# Patient Record
Sex: MALE | Race: WHITE | Employment: PART TIME | URBAN - METROPOLITAN AREA
[De-identification: names, ages, dates, MRNs, and addresses within clinical notes are randomized per-mention and may not be internally consistent; named-entity substitution may affect disease eponyms.]

---

## 2018-03-26 ENCOUNTER — HOSPITAL ENCOUNTER (EMERGENCY)
Age: 53
Discharge: HOME | End: 2018-03-26
Payer: MEDICARE

## 2018-03-26 VITALS
TEMPERATURE: 97.4 F | HEART RATE: 93 BPM | SYSTOLIC BLOOD PRESSURE: 136 MMHG | RESPIRATION RATE: 18 BRPM | WEIGHT: 190 LBS | HEIGHT: 70 IN | BODY MASS INDEX: 27.2 KG/M2 | OXYGEN SATURATION: 97 % | DIASTOLIC BLOOD PRESSURE: 72 MMHG

## 2018-03-26 DIAGNOSIS — R05.9 COUGH: ICD-10-CM

## 2018-03-26 DIAGNOSIS — J06.9 UPPER RESPIRATORY TRACT INFECTION, UNSPECIFIED TYPE: Primary | ICD-10-CM

## 2018-03-26 PROCEDURE — 99282 EMERGENCY DEPT VISIT SF MDM: CPT

## 2018-03-26 RX ORDER — ALBUTEROL SULFATE 90 UG/1
2 AEROSOL, METERED RESPIRATORY (INHALATION) EVERY 6 HOURS PRN
Qty: 1 INHALER | Refills: 0 | Status: SHIPPED | OUTPATIENT
Start: 2018-03-26 | End: 2020-01-06

## 2018-03-26 RX ORDER — LORATADINE AND PSEUDOEPHEDRINE 10; 240 MG/1; MG/1
1 TABLET, EXTENDED RELEASE ORAL DAILY
Qty: 12 TABLET | Refills: 0 | Status: SHIPPED | OUTPATIENT
Start: 2018-03-26 | End: 2020-01-06

## 2018-03-26 RX ORDER — DOXYCYCLINE HYCLATE 100 MG
100 TABLET ORAL 2 TIMES DAILY
Qty: 14 TABLET | Refills: 0 | Status: SHIPPED | OUTPATIENT
Start: 2018-03-26 | End: 2018-04-02

## 2018-03-26 NOTE — ED PROVIDER NOTES
[facility]     Department of Emergency Medicine   ED  Provider Note  Admit Date/RoomTime: 3/26/2018 10:23 AM  ED Room: /    Chief Complaint:   Cough    History of Present Illness      Queta Shaw is a 48 y.o. old male who presents to the ED for cough And congestion that has been ongoing for the past couple days. Patient states he does have a past history of emphysema. He does not currently take any inhalers. He is a diabetic. He denies any chest pain or shortness of breath. He has no abdominal pain, nausea, vomiting, diarrhea, limb pain or swelling, fever/chills, neck pain, back pain, or recent trauma/injury. ×3 and in no apparent distress at this exam. Patient is nontoxic appearing. Patient is a current, everyday smoker. ROS   Pertinent positives and negatives are stated within HPI, all other systems reviewed and are negative. Past Medical History:  has a past medical history of Diabetes mellitus ([address] and Emphysema. Past Surgical History:  has a past surgical history that includes Vasectomy. Social History:  reports that he has been smoking Cigarettes. He has been smoking about 2.00 packs per day. He does not have any smokeless tobacco history on file. He reports that he drinks alcohol. He reports that he does not use drugs. Family History: family history is not on file. The patients home medications have been reviewed. Allergies: Patient has no known allergies. Allergies have been reviewed with patient. Physical Exam   VS:  /72   Pulse 93   Temp 97.4 °F (36.3 °C) (Oral)   Resp 18   Ht 5' 10\" (1.778 m)   Wt 190 lb (86.2 kg)   SpO2 97%   BMI 27.26 kg/m²      Oxygen Saturation Interpretation: Normal.    Constitutional:  Alert, development consistent with age. NAD  Eyes: EOMI, non-injected conjunctiva   Ears:  Fluid behind both TMs  Throat: Moderate erythema, uvula midline, Airway patent     Neck/Lymphatic:  Supple. There is no cervical node tenderness.  No meningeal signs   Respiratory:  Clear to auscultation and breath sounds equal. No respiratory distress  CV: Regular rate and rhythm  GI:  Abdomen soft, nontender, No firm or pulsatile mass. Integument:  No rashes or erythema present. Neurological:  Motor functions intact. Lab / Imaging Results   (All laboratory and radiology results have been personally reviewed by myself)  Labs:  No results found for this visit on 03/26/18. Imaging: All Radiology results interpreted by Radiologist unless otherwise noted. No orders to display     ED Course / Medical Decision Making   ED Medications:   Medications - No data to display    Consults:  None    Procedures:  None    Medical Decision Making:   Patient is well appearing, non toxic and appropriate for outpatient management. Plan is for symptom management and PCP follow up. Counseling: The emergency provider has spoken with the patient and discussed todays results, in addition to providing specific details for the plan of care and counseling regarding the diagnosis and prognosis. Questions are answered at this time and they are agreeable with the plan. All results reviewed with pt and all questions answered. Patient understands that they must follow-up with PCP. Patient was advised to return to ED if symptoms worsen or new symptoms develop. Pt remained nontoxic, non-hypoxic, and A&O x4 during this ED visit. They agreed with plan of care, discharge, and importance of follow-up. Pt was in no distress at discharge. Vitals stable. Patient educated on newly prescribed medication. Assessment     1. Upper respiratory tract infection, unspecified type    2.  Cough      Plan   Discharge to home   Patient condition is good    New Medications     Discharge Medication List as of 3/26/2018 10:47 AM      START taking these medications    Details   doxycycline hyclate (VIBRA-TABS) 100 MG tablet Take 1 tablet by mouth 2 times daily for 7 days, Disp-14 tablet,

## 2018-06-12 ENCOUNTER — HOSPITAL ENCOUNTER (OUTPATIENT)
Age: 53
Discharge: HOME | End: 2018-06-14
Payer: MEDICARE

## 2018-06-12 PROCEDURE — G0103 PSA SCREENING: HCPCS

## 2018-06-13 LAB — PROSTATE SPECIFIC ANTIGEN: 0.14 NG/ML (ref 0–4)

## 2019-02-08 ENCOUNTER — HOSPITAL ENCOUNTER (OUTPATIENT)
Dept: ULTRASOUND IMAGING | Age: 54
Discharge: HOME | End: 2019-02-10
Payer: MEDICARE

## 2019-02-08 DIAGNOSIS — I10 ESSENTIAL HYPERTENSION, BENIGN: ICD-10-CM

## 2019-02-08 DIAGNOSIS — E11.00 TYPE 2 DIABETES MELLITUS WITH HYPEROSMOLARITY WITHOUT COMA, WITHOUT LONG-TERM CURRENT USE OF INSULIN (HCC): ICD-10-CM

## 2019-02-08 DIAGNOSIS — I10 ESSENTIAL HYPERTENSION, MALIGNANT: ICD-10-CM

## 2019-02-08 DIAGNOSIS — I73.9 PERIPHERAL VASCULAR DISEASE, UNSPECIFIED (HCC): ICD-10-CM

## 2019-02-08 PROCEDURE — 93880 EXTRACRANIAL BILAT STUDY: CPT

## 2019-02-08 PROCEDURE — 93930 UPPER EXTREMITY STUDY: CPT

## 2019-02-11 ENCOUNTER — HOSPITAL ENCOUNTER (OUTPATIENT)
Age: 54
Discharge: HOME | End: 2019-02-11
Payer: MEDICARE

## 2019-02-11 ENCOUNTER — OFFICE VISIT (OUTPATIENT)
Dept: VASCULAR SURGERY | Age: 54
End: 2019-02-11
Payer: MEDICARE

## 2019-02-11 VITALS — SYSTOLIC BLOOD PRESSURE: 108 MMHG | DIASTOLIC BLOOD PRESSURE: 66 MMHG | HEART RATE: 68 BPM

## 2019-02-11 DIAGNOSIS — I65.23 ASYMPTOMATIC BILATERAL CAROTID ARTERY STENOSIS: ICD-10-CM

## 2019-02-11 DIAGNOSIS — Z72.0 TOBACCO ABUSE: ICD-10-CM

## 2019-02-11 DIAGNOSIS — I70.8 OCCLUSION OF LEFT SUBCLAVIAN ARTERY: ICD-10-CM

## 2019-02-11 DIAGNOSIS — Z71.6 TOBACCO ABUSE COUNSELING: ICD-10-CM

## 2019-02-11 DIAGNOSIS — I73.9 PVD (PERIPHERAL VASCULAR DISEASE) (HCC): ICD-10-CM

## 2019-02-11 DIAGNOSIS — I65.23 ASYMPTOMATIC BILATERAL CAROTID ARTERY STENOSIS: Primary | ICD-10-CM

## 2019-02-11 LAB
ANION GAP SERPL CALCULATED.3IONS-SCNC: 9 MMOL/L (ref 7–16)
BUN BLDV-MCNC: 12 MG/DL (ref 6–20)
CALCIUM SERPL-MCNC: 9.4 MG/DL (ref 8.6–10.2)
CHLORIDE BLD-SCNC: 103 MMOL/L (ref 98–107)
CO2: 28 MMOL/L (ref 22–29)
CREAT SERPL-MCNC: 0.7 MG/DL (ref 0.7–1.2)
GFR AFRICAN AMERICAN: >60
GFR NON-AFRICAN AMERICAN: >60 ML/MIN/1.73
GLUCOSE BLD-MCNC: 204 MG/DL (ref 74–99)
POTASSIUM SERPL-SCNC: 4.6 MMOL/L (ref 3.5–5)
SODIUM BLD-SCNC: 140 MMOL/L (ref 132–146)

## 2019-02-11 PROCEDURE — 99205 OFFICE O/P NEW HI 60 MIN: CPT | Performed by: SURGERY

## 2019-02-11 PROCEDURE — 36415 COLL VENOUS BLD VENIPUNCTURE: CPT

## 2019-02-11 PROCEDURE — 80048 BASIC METABOLIC PNL TOTAL CA: CPT

## 2019-02-11 RX ORDER — INSULIN GLARGINE 300 U/ML
INJECTION, SOLUTION SUBCUTANEOUS
Refills: 2 | COMMUNITY
Start: 2019-02-06

## 2019-02-11 RX ORDER — NICOTINE 21 MG/24HR
1 PATCH, TRANSDERMAL 24 HOURS TRANSDERMAL EVERY 24 HOURS
Qty: 30 PATCH | Refills: 3 | Status: SHIPPED | OUTPATIENT
Start: 2019-02-11 | End: 2020-01-06

## 2019-02-15 ENCOUNTER — TELEPHONE (OUTPATIENT)
Dept: VASCULAR SURGERY | Age: 54
End: 2019-02-15

## 2019-02-19 ENCOUNTER — HOSPITAL ENCOUNTER (OUTPATIENT)
Dept: INTERVENTIONAL RADIOLOGY/VASCULAR | Age: 54
Discharge: HOME | End: 2019-02-21
Payer: MEDICARE

## 2019-02-19 ENCOUNTER — HOSPITAL ENCOUNTER (OUTPATIENT)
Dept: CT IMAGING | Age: 54
Discharge: HOME | End: 2019-02-21
Payer: MEDICARE

## 2019-02-19 DIAGNOSIS — I73.9 PVD (PERIPHERAL VASCULAR DISEASE) (HCC): ICD-10-CM

## 2019-02-19 DIAGNOSIS — I65.23 ASYMPTOMATIC BILATERAL CAROTID ARTERY STENOSIS: ICD-10-CM

## 2019-02-19 PROCEDURE — 6360000004 HC RX CONTRAST MEDICATION: Performed by: RADIOLOGY

## 2019-02-19 PROCEDURE — 93923 UPR/LXTR ART STDY 3+ LVLS: CPT

## 2019-02-19 PROCEDURE — 2580000003 HC RX 258: Performed by: RADIOLOGY

## 2019-02-19 RX ORDER — SODIUM CHLORIDE 0.9 % (FLUSH) 0.9 %
10 SYRINGE (ML) INJECTION 2 TIMES DAILY
Status: DISCONTINUED | OUTPATIENT
Start: 2019-02-19 | End: 2019-02-22 | Stop reason: HOSPADM

## 2019-02-19 RX ADMIN — Medication 10 ML: at 06:35

## 2019-02-19 RX ADMIN — IOPAMIDOL 160 ML: 755 INJECTION, SOLUTION INTRAVENOUS at 06:53

## 2019-02-21 ENCOUNTER — HOSPITAL ENCOUNTER (OUTPATIENT)
Dept: CT IMAGING | Age: 54
Discharge: HOME | End: 2019-02-23
Payer: MEDICARE

## 2019-02-21 PROCEDURE — 6360000004 HC RX CONTRAST MEDICATION: Performed by: RADIOLOGY

## 2019-02-21 PROCEDURE — 70498 CT ANGIOGRAPHY NECK: CPT

## 2019-02-21 PROCEDURE — 2580000003 HC RX 258: Performed by: RADIOLOGY

## 2019-02-21 RX ORDER — SODIUM CHLORIDE 0.9 % (FLUSH) 0.9 %
10 SYRINGE (ML) INJECTION 2 TIMES DAILY
Status: DISCONTINUED | OUTPATIENT
Start: 2019-02-21 | End: 2019-02-24 | Stop reason: HOSPADM

## 2019-02-21 RX ADMIN — Medication 10 ML: at 06:57

## 2019-02-21 RX ADMIN — IOPAMIDOL 90 ML: 755 INJECTION, SOLUTION INTRAVENOUS at 07:08

## 2019-02-25 ENCOUNTER — OFFICE VISIT (OUTPATIENT)
Dept: VASCULAR SURGERY | Age: 54
End: 2019-02-25
Payer: MEDICARE

## 2019-02-25 DIAGNOSIS — I73.9 PVD (PERIPHERAL VASCULAR DISEASE) (HCC): ICD-10-CM

## 2019-02-25 DIAGNOSIS — Z71.6 TOBACCO ABUSE COUNSELING: ICD-10-CM

## 2019-02-25 DIAGNOSIS — I70.8 OCCLUSION OF LEFT SUBCLAVIAN ARTERY: ICD-10-CM

## 2019-02-25 DIAGNOSIS — I65.23 ASYMPTOMATIC BILATERAL CAROTID ARTERY STENOSIS: Primary | ICD-10-CM

## 2019-02-25 DIAGNOSIS — Z72.0 TOBACCO ABUSE: ICD-10-CM

## 2019-02-25 PROCEDURE — 99214 OFFICE O/P EST MOD 30 MIN: CPT | Performed by: SURGERY

## 2019-09-09 ENCOUNTER — HOSPITAL ENCOUNTER (OUTPATIENT)
Dept: CARDIOLOGY | Age: 54
Discharge: HOME | End: 2019-09-09
Payer: MEDICARE

## 2019-09-09 ENCOUNTER — OFFICE VISIT (OUTPATIENT)
Dept: VASCULAR SURGERY | Age: 54
End: 2019-09-09
Payer: MEDICARE

## 2019-09-09 DIAGNOSIS — I65.23 ASYMPTOMATIC BILATERAL CAROTID ARTERY STENOSIS: ICD-10-CM

## 2019-09-09 DIAGNOSIS — I73.9 PVD (PERIPHERAL VASCULAR DISEASE) (HCC): ICD-10-CM

## 2019-09-09 DIAGNOSIS — I65.23 ASYMPTOMATIC BILATERAL CAROTID ARTERY STENOSIS: Primary | ICD-10-CM

## 2019-09-09 PROCEDURE — 93880 EXTRACRANIAL BILAT STUDY: CPT

## 2019-09-09 PROCEDURE — 99213 OFFICE O/P EST LOW 20 MIN: CPT | Performed by: NURSE PRACTITIONER

## 2019-09-09 NOTE — PROGRESS NOTES
partner: Not on file     Emotionally abused: Not on file     Physically abused: Not on file     Forced sexual activity: Not on file   Other Topics Concern    Not on file   Social History Narrative    Not on file     Family hx  Heart disease, mi  Denies family hx of stroke. Labs  Lab Results   Component Value Date    WBC 6.4 03/30/2016    HGB 17.1 (H) 03/30/2016    HCT 50.4 03/30/2016     03/30/2016    PROTIME 11.7 06/08/2015    INR 1.1 06/08/2015    APTT 26.7 06/08/2015    K 4.6 02/11/2019    BUN 12 02/11/2019    CREATININE 0.7 02/11/2019     PHYSICAL EXAM:    CONSTITUTIONAL:   Awake, alert, cooperative  PSYCHIATRIC :  Oriented to time, place and person     Appropriate insight to disease process  EYES: Lids and lashes normal  ENT:  External ears and nose without lesions   Hearing deficits not noted  CN II _ XII intact, + glasses  NECK: Supple, symmetrical, trachea midline   Carotid bruit on left  LUNGS:  No increased work of breathing                 Clear to auscultation  CARDIOVASCULAR:  regular rate and rhythm   ABDOMEN:  soft, non-distended, non-tender   Aorta is not palpable  SKIN:   Normal skin color   Texture and turgor normal, no induration  EXTREMITIES:   R UE 4/5 strength  L UE 5/5 strength  R LE Edema absent  L LE Edema absent    RADIOLOGY:  R ICA <50% stenosis   R Vertebral has antegrade flow  L ICA 50-59% stenosis   L Vertebral has retrograde flow     A/P Asymptomatic Bilateral Carotid Stenosis  · Ultrasound from today shows  ? R ICA < 50% stenosis, unchanged from previous   ?  L ICA 50-59% stenosis, improved from previous imaging   · No indication for intervention at this time as he is assx and less than 80% stenosis  · Continue medical management with asa  · Emphasized importance of Tobacco cessation - he has cut down from 2.5 packs to 1 pack a day  · Discussed with patient pathophysiology of carotid stenosis and all ?s answered  · Discussed with patient symptoms of stroke, TIA and they

## 2019-12-02 ENCOUNTER — HOSPITAL ENCOUNTER (OUTPATIENT)
Age: 54
Discharge: HOME | End: 2019-12-04
Payer: MEDICARE

## 2019-12-02 LAB
ALBUMIN SERPL-MCNC: 4.2 G/DL (ref 3.5–5.2)
ALP BLD-CCNC: 84 U/L (ref 40–129)
ALT SERPL-CCNC: 26 U/L (ref 0–40)
ANION GAP SERPL CALCULATED.3IONS-SCNC: 12 MMOL/L (ref 7–16)
AST SERPL-CCNC: 24 U/L (ref 0–39)
BILIRUB SERPL-MCNC: 0.3 MG/DL (ref 0–1.2)
BUN BLDV-MCNC: 14 MG/DL (ref 6–20)
CALCIUM SERPL-MCNC: 9.6 MG/DL (ref 8.6–10.2)
CHLORIDE BLD-SCNC: 101 MMOL/L (ref 98–107)
CO2: 25 MMOL/L (ref 22–29)
CREAT SERPL-MCNC: 0.7 MG/DL (ref 0.7–1.2)
GFR AFRICAN AMERICAN: >60
GFR NON-AFRICAN AMERICAN: >60 ML/MIN/1.73
GLUCOSE BLD-MCNC: 133 MG/DL (ref 74–99)
HCT VFR BLD CALC: 47 % (ref 37–54)
HEMOGLOBIN: 15.7 G/DL (ref 12.5–16.5)
MCH RBC QN AUTO: 33.1 PG (ref 26–35)
MCHC RBC AUTO-ENTMCNC: 33.4 % (ref 32–34.5)
MCV RBC AUTO: 99.2 FL (ref 80–99.9)
PDW BLD-RTO: 12.5 FL (ref 11.5–15)
PLATELET # BLD: 163 E9/L (ref 130–450)
PMV BLD AUTO: 10 FL (ref 7–12)
POTASSIUM SERPL-SCNC: 4 MMOL/L (ref 3.5–5)
RBC # BLD: 4.74 E12/L (ref 3.8–5.8)
SODIUM BLD-SCNC: 138 MMOL/L (ref 132–146)
TESTOSTERONE TOTAL: 525.3 NG/DL
TOTAL PROTEIN: 7.2 G/DL (ref 6.4–8.3)
WBC # BLD: 6.4 E9/L (ref 4.5–11.5)

## 2019-12-02 PROCEDURE — 84403 ASSAY OF TOTAL TESTOSTERONE: CPT

## 2019-12-02 PROCEDURE — 85027 COMPLETE CBC AUTOMATED: CPT

## 2019-12-02 PROCEDURE — G0103 PSA SCREENING: HCPCS

## 2019-12-02 PROCEDURE — 84153 ASSAY OF PSA TOTAL: CPT

## 2019-12-02 PROCEDURE — 80053 COMPREHEN METABOLIC PANEL: CPT

## 2019-12-10 LAB
PROSTATE SPECIFIC ANTIGEN: 0.14 NG/ML (ref 0–4)
PROSTATE SPECIFIC ANTIGEN: ABNORMAL NG/ML (ref 0–4)

## 2020-01-06 ENCOUNTER — HOSPITAL ENCOUNTER (OUTPATIENT)
Age: 55
Setting detail: OBSERVATION
Discharge: HOME | End: 2020-01-08
Attending: INTERNAL MEDICINE | Admitting: INTERNAL MEDICINE
Payer: MEDICARE

## 2020-01-06 ENCOUNTER — HOSPITAL ENCOUNTER (EMERGENCY)
Age: 55
Discharge: ANOTHER HOSPITAL | End: 2020-01-06
Attending: EMERGENCY MEDICINE
Payer: MEDICARE

## 2020-01-06 ENCOUNTER — APPOINTMENT (OUTPATIENT)
Dept: CT IMAGING | Age: 55
End: 2020-01-06
Payer: MEDICARE

## 2020-01-06 VITALS
SYSTOLIC BLOOD PRESSURE: 113 MMHG | BODY MASS INDEX: 29.35 KG/M2 | HEIGHT: 70 IN | HEART RATE: 73 BPM | DIASTOLIC BLOOD PRESSURE: 78 MMHG | RESPIRATION RATE: 14 BRPM | OXYGEN SATURATION: 97 % | TEMPERATURE: 97.6 F | WEIGHT: 205 LBS

## 2020-01-06 PROBLEM — Z71.6 TOBACCO ABUSE COUNSELING: Status: RESOLVED | Noted: 2019-02-11 | Resolved: 2020-01-06

## 2020-01-06 PROBLEM — R29.90 STROKE-LIKE SYMPTOMS: Status: ACTIVE | Noted: 2020-01-06

## 2020-01-06 PROBLEM — I70.8 OCCLUSION OF LEFT SUBCLAVIAN ARTERY: Status: RESOLVED | Noted: 2019-02-11 | Resolved: 2020-01-06

## 2020-01-06 LAB
ALBUMIN SERPL-MCNC: 4.5 G/DL (ref 3.5–5.2)
ALP BLD-CCNC: 100 U/L (ref 40–129)
ALT SERPL-CCNC: 45 U/L (ref 0–40)
ANION GAP SERPL CALCULATED.3IONS-SCNC: 14 MMOL/L (ref 7–16)
APTT: 30.8 SEC (ref 24.5–35.1)
AST SERPL-CCNC: 32 U/L (ref 0–39)
BASOPHILS ABSOLUTE: 0.05 E9/L (ref 0–0.2)
BASOPHILS RELATIVE PERCENT: 0.8 % (ref 0–2)
BILIRUB SERPL-MCNC: 0.5 MG/DL (ref 0–1.2)
BUN BLDV-MCNC: 12 MG/DL (ref 6–20)
CALCIUM SERPL-MCNC: 9.6 MG/DL (ref 8.6–10.2)
CHLORIDE BLD-SCNC: 100 MMOL/L (ref 98–107)
CO2: 24 MMOL/L (ref 22–29)
CREAT SERPL-MCNC: 0.6 MG/DL (ref 0.7–1.2)
EKG ATRIAL RATE: 76 BPM
EKG P AXIS: 60 DEGREES
EKG P-R INTERVAL: 200 MS
EKG Q-T INTERVAL: 384 MS
EKG QRS DURATION: 106 MS
EKG QTC CALCULATION (BAZETT): 432 MS
EKG R AXIS: 123 DEGREES
EKG T AXIS: 51 DEGREES
EKG VENTRICULAR RATE: 76 BPM
EOSINOPHILS ABSOLUTE: 0.35 E9/L (ref 0.05–0.5)
EOSINOPHILS RELATIVE PERCENT: 5.8 % (ref 0–6)
GFR AFRICAN AMERICAN: >60
GFR NON-AFRICAN AMERICAN: >60 ML/MIN/1.73
GLUCOSE BLD-MCNC: 156 MG/DL (ref 74–99)
HCT VFR BLD CALC: 50.3 % (ref 37–54)
HEMOGLOBIN: 17.6 G/DL (ref 12.5–16.5)
IMMATURE GRANULOCYTES #: 0 E9/L
IMMATURE GRANULOCYTES %: 0 % (ref 0–5)
INR BLD: 1
LYMPHOCYTES ABSOLUTE: 2.71 E9/L (ref 1.5–4)
LYMPHOCYTES RELATIVE PERCENT: 44.8 % (ref 20–42)
MCH RBC QN AUTO: 34.6 PG (ref 26–35)
MCHC RBC AUTO-ENTMCNC: 35 % (ref 32–34.5)
MCV RBC AUTO: 98.8 FL (ref 80–99.9)
METER GLUCOSE: 219 MG/DL (ref 74–99)
MONOCYTES ABSOLUTE: 0.68 E9/L (ref 0.1–0.95)
MONOCYTES RELATIVE PERCENT: 11.2 % (ref 2–12)
NEUTROPHILS ABSOLUTE: 2.26 E9/L (ref 1.8–7.3)
NEUTROPHILS RELATIVE PERCENT: 37.4 % (ref 43–80)
PDW BLD-RTO: 12.6 FL (ref 11.5–15)
PLATELET # BLD: 154 E9/L (ref 130–450)
PMV BLD AUTO: 9.5 FL (ref 7–12)
POTASSIUM REFLEX MAGNESIUM: 4.6 MMOL/L (ref 3.5–5)
PROTHROMBIN TIME: 11.2 SEC (ref 9.3–12.4)
RBC # BLD: 5.09 E12/L (ref 3.8–5.8)
SODIUM BLD-SCNC: 138 MMOL/L (ref 132–146)
TOTAL PROTEIN: 7.4 G/DL (ref 6.4–8.3)
WBC # BLD: 6.1 E9/L (ref 4.5–11.5)

## 2020-01-06 PROCEDURE — 80053 COMPREHEN METABOLIC PANEL: CPT

## 2020-01-06 PROCEDURE — 6370000000 HC RX 637 (ALT 250 FOR IP): Performed by: INTERNAL MEDICINE

## 2020-01-06 PROCEDURE — 6370000000 HC RX 637 (ALT 250 FOR IP): Performed by: NURSE PRACTITIONER

## 2020-01-06 PROCEDURE — 85730 THROMBOPLASTIN TIME PARTIAL: CPT

## 2020-01-06 PROCEDURE — 2580000003 HC RX 258: Performed by: NURSE PRACTITIONER

## 2020-01-06 PROCEDURE — 93010 ELECTROCARDIOGRAM REPORT: CPT | Performed by: INTERNAL MEDICINE

## 2020-01-06 PROCEDURE — 93005 ELECTROCARDIOGRAM TRACING: CPT | Performed by: EMERGENCY MEDICINE

## 2020-01-06 PROCEDURE — 85025 COMPLETE CBC W/AUTO DIFF WBC: CPT

## 2020-01-06 PROCEDURE — 99285 EMERGENCY DEPT VISIT HI MDM: CPT

## 2020-01-06 PROCEDURE — G0378 HOSPITAL OBSERVATION PER HR: HCPCS

## 2020-01-06 PROCEDURE — 85610 PROTHROMBIN TIME: CPT

## 2020-01-06 PROCEDURE — 82962 GLUCOSE BLOOD TEST: CPT

## 2020-01-06 PROCEDURE — 70450 CT HEAD/BRAIN W/O DYE: CPT

## 2020-01-06 PROCEDURE — G0379 DIRECT REFER HOSPITAL OBSERV: HCPCS

## 2020-01-06 RX ORDER — SODIUM CHLORIDE 0.9 % (FLUSH) 0.9 %
10 SYRINGE (ML) INJECTION PRN
Status: DISCONTINUED | OUTPATIENT
Start: 2020-01-06 | End: 2020-01-08 | Stop reason: HOSPADM

## 2020-01-06 RX ORDER — DIVALPROEX SODIUM 250 MG/1
250 TABLET, DELAYED RELEASE ORAL 3 TIMES DAILY
Status: CANCELLED | OUTPATIENT
Start: 2020-01-06

## 2020-01-06 RX ORDER — NICOTINE POLACRILEX 4 MG
15 LOZENGE BUCCAL PRN
Status: CANCELLED | OUTPATIENT
Start: 2020-01-06

## 2020-01-06 RX ORDER — TAMSULOSIN HYDROCHLORIDE 0.4 MG/1
0.4 CAPSULE ORAL DAILY
Status: DISCONTINUED | OUTPATIENT
Start: 2020-01-06 | End: 2020-01-08 | Stop reason: HOSPADM

## 2020-01-06 RX ORDER — LISINOPRIL 5 MG/1
10 TABLET ORAL DAILY
COMMUNITY
End: 2022-01-02 | Stop reason: ALTCHOICE

## 2020-01-06 RX ORDER — LISINOPRIL 5 MG/1
5 TABLET ORAL DAILY
Status: DISCONTINUED | OUTPATIENT
Start: 2020-01-06 | End: 2020-01-08 | Stop reason: HOSPADM

## 2020-01-06 RX ORDER — DIVALPROEX SODIUM 250 MG/1
250 TABLET, DELAYED RELEASE ORAL 3 TIMES DAILY
Status: DISCONTINUED | OUTPATIENT
Start: 2020-01-06 | End: 2020-01-06

## 2020-01-06 RX ORDER — ONDANSETRON 2 MG/ML
4 INJECTION INTRAMUSCULAR; INTRAVENOUS EVERY 6 HOURS PRN
Status: DISCONTINUED | OUTPATIENT
Start: 2020-01-06 | End: 2020-01-08 | Stop reason: HOSPADM

## 2020-01-06 RX ORDER — DEXTROSE MONOHYDRATE 25 G/50ML
12.5 INJECTION, SOLUTION INTRAVENOUS PRN
Status: DISCONTINUED | OUTPATIENT
Start: 2020-01-06 | End: 2020-01-07

## 2020-01-06 RX ORDER — ASPIRIN 81 MG/1
81 TABLET, CHEWABLE ORAL DAILY
Status: DISCONTINUED | OUTPATIENT
Start: 2020-01-06 | End: 2020-01-08 | Stop reason: HOSPADM

## 2020-01-06 RX ORDER — DIVALPROEX SODIUM 250 MG/1
250 TABLET, DELAYED RELEASE ORAL 2 TIMES DAILY
Status: DISCONTINUED | OUTPATIENT
Start: 2020-01-06 | End: 2020-01-08 | Stop reason: HOSPADM

## 2020-01-06 RX ORDER — LISINOPRIL 5 MG/1
5 TABLET ORAL DAILY
Status: CANCELLED | OUTPATIENT
Start: 2020-01-06

## 2020-01-06 RX ORDER — SODIUM CHLORIDE 0.9 % (FLUSH) 0.9 %
10 SYRINGE (ML) INJECTION EVERY 12 HOURS SCHEDULED
Status: CANCELLED | OUTPATIENT
Start: 2020-01-06

## 2020-01-06 RX ORDER — DEXTROSE MONOHYDRATE 25 G/50ML
12.5 INJECTION, SOLUTION INTRAVENOUS PRN
Status: CANCELLED | OUTPATIENT
Start: 2020-01-06

## 2020-01-06 RX ORDER — TAMSULOSIN HYDROCHLORIDE 0.4 MG/1
0.4 CAPSULE ORAL DAILY
Status: CANCELLED | OUTPATIENT
Start: 2020-01-06

## 2020-01-06 RX ORDER — DIVALPROEX SODIUM 250 MG/1
250 TABLET, DELAYED RELEASE ORAL 2 TIMES DAILY
COMMUNITY

## 2020-01-06 RX ORDER — SODIUM CHLORIDE 0.9 % (FLUSH) 0.9 %
10 SYRINGE (ML) INJECTION PRN
Status: CANCELLED | OUTPATIENT
Start: 2020-01-06

## 2020-01-06 RX ORDER — DEXTROSE MONOHYDRATE 50 MG/ML
100 INJECTION, SOLUTION INTRAVENOUS PRN
Status: CANCELLED | OUTPATIENT
Start: 2020-01-06

## 2020-01-06 RX ORDER — ASPIRIN 81 MG/1
81 TABLET, CHEWABLE ORAL DAILY
Status: CANCELLED | OUTPATIENT
Start: 2020-01-06

## 2020-01-06 RX ORDER — DEXTROSE MONOHYDRATE 50 MG/ML
100 INJECTION, SOLUTION INTRAVENOUS PRN
Status: DISCONTINUED | OUTPATIENT
Start: 2020-01-06 | End: 2020-01-07

## 2020-01-06 RX ORDER — TAMSULOSIN HYDROCHLORIDE 0.4 MG/1
0.4 CAPSULE ORAL DAILY
COMMUNITY
Start: 2013-10-24

## 2020-01-06 RX ORDER — NICOTINE POLACRILEX 4 MG
15 LOZENGE BUCCAL PRN
Status: DISCONTINUED | OUTPATIENT
Start: 2020-01-06 | End: 2020-01-07

## 2020-01-06 RX ORDER — SODIUM CHLORIDE 0.9 % (FLUSH) 0.9 %
10 SYRINGE (ML) INJECTION EVERY 12 HOURS SCHEDULED
Status: DISCONTINUED | OUTPATIENT
Start: 2020-01-06 | End: 2020-01-08 | Stop reason: HOSPADM

## 2020-01-06 RX ORDER — ONDANSETRON 2 MG/ML
4 INJECTION INTRAMUSCULAR; INTRAVENOUS EVERY 6 HOURS PRN
Status: CANCELLED | OUTPATIENT
Start: 2020-01-06

## 2020-01-06 RX ADMIN — INSULIN LISPRO 2 UNITS: 100 INJECTION, SOLUTION INTRAVENOUS; SUBCUTANEOUS at 22:04

## 2020-01-06 RX ADMIN — DIVALPROEX SODIUM 250 MG: 250 TABLET, DELAYED RELEASE ORAL at 22:02

## 2020-01-06 RX ADMIN — METFORMIN HYDROCHLORIDE 1000 MG: 1000 TABLET ORAL at 22:02

## 2020-01-06 RX ADMIN — LISINOPRIL 5 MG: 5 TABLET ORAL at 22:02

## 2020-01-06 RX ADMIN — ASPIRIN 81 MG 81 MG: 81 TABLET ORAL at 22:11

## 2020-01-06 RX ADMIN — TAMSULOSIN HYDROCHLORIDE 0.4 MG: 0.4 CAPSULE ORAL at 22:03

## 2020-01-06 RX ADMIN — SODIUM CHLORIDE, PRESERVATIVE FREE 10 ML: 5 INJECTION INTRAVENOUS at 22:11

## 2020-01-06 ASSESSMENT — VISUAL ACUITY
OD: 20/30
OS: 20/50
OU: 20/30

## 2020-01-06 ASSESSMENT — PAIN DESCRIPTION - PAIN TYPE: TYPE: ACUTE PAIN

## 2020-01-06 ASSESSMENT — ENCOUNTER SYMPTOMS
SHORTNESS OF BREATH: 0
TROUBLE SWALLOWING: 0
NAUSEA: 0
BLOOD IN STOOL: 0
VOMITING: 0
COUGH: 0
CHEST TIGHTNESS: 0
EYE REDNESS: 0
PHOTOPHOBIA: 0
EYE PAIN: 0
VOICE CHANGE: 0
ABDOMINAL PAIN: 0
SINUS PRESSURE: 0
COLOR CHANGE: 0
SINUS PAIN: 0
DIARRHEA: 0
CONSTIPATION: 0

## 2020-01-06 ASSESSMENT — PAIN SCALES - GENERAL
PAINLEVEL_OUTOF10: 4
PAINLEVEL_OUTOF10: 0

## 2020-01-06 ASSESSMENT — PAIN DESCRIPTION - ORIENTATION: ORIENTATION: LEFT

## 2020-01-06 ASSESSMENT — PAIN DESCRIPTION - FREQUENCY: FREQUENCY: CONTINUOUS

## 2020-01-06 ASSESSMENT — PAIN DESCRIPTION - LOCATION: LOCATION: HEAD

## 2020-01-06 NOTE — ED PROVIDER NOTES
Patient is a 57-year-old male, the past medical history of diabetes mellitus, tobacco use, hypertension, peripheral vascular disease, who presents for headache and double vision. The patient developed double vision on Saturday evening. He reports that he was driving and he noted seeing 2 white lines in the road that were [id number]. This has persisted since. Nothing seems to make it worse. Closing 1 eye or the other seems to make it better. He denies previous similar episodes. He reports that the days also developed a pressure-like pain behind his right eye. It is nonradiating. He is taken nothing for relief. He denies any recent injuries or traumas. He denies difficulty with speech or facial droop, weakness in his arms or legs, palpitations, as of breath or chest pain. Denies previous eye surgeries. Patient reports seeing 2 of objects only [id number] but not vertically. The history is provided by the patient and medical records. Review of Systems   Constitutional: Negative for chills, fatigue and fever. HENT: Negative for congestion, ear discharge, ear pain, hearing loss, sinus pressure, sinus pain, tinnitus, trouble swallowing and voice change. Eyes: Positive for visual disturbance (diplopia). Negative for photophobia, pain and redness. Respiratory: Negative for cough, chest tightness and shortness of breath. Cardiovascular: Negative for chest pain and palpitations. Gastrointestinal: Negative for abdominal pain, blood in stool, constipation, diarrhea, nausea and vomiting. Genitourinary: Negative for dysuria, flank pain, frequency, hematuria and urgency. Musculoskeletal: Negative for arthralgias, myalgias, neck pain and neck stiffness. Skin: Negative for color change, rash and wound. Allergic/Immunologic: Negative for immunocompromised state. Neurological: Positive for headaches.  Negative for dizziness, syncope, facial asymmetry, speech difficulty, weakness and light-headedness. Hematological: Negative for adenopathy. Physical Exam  Vitals signs and nursing note reviewed. Constitutional:       General: He is not in acute distress. Appearance: He is well-developed. He is not diaphoretic. HENT:      Head: Normocephalic and atraumatic. Right Ear: External ear normal.      Left Ear: External ear normal.      Nose: Nose normal.      Mouth/Throat:      Pharynx: No oropharyngeal exudate. Eyes:      General: No scleral icterus. Right eye: No discharge. Left eye: No discharge. Conjunctiva/sclera: Conjunctivae normal.      Pupils: Pupils are equal, round, and reactive to light. Comments: No horizontal or vertical nystagmus   Neck:      Musculoskeletal: Normal range of motion and neck supple. Vascular: No carotid bruit. Cardiovascular:      Rate and Rhythm: Normal rate and regular rhythm. Pulses: Normal pulses. Heart sounds: Normal heart sounds. No murmur. No friction rub. No gallop. Pulmonary:      Effort: Pulmonary effort is normal. No respiratory distress. Breath sounds: Normal breath sounds. No wheezing or rales. Abdominal:      General: Bowel sounds are normal. There is no distension. Palpations: Abdomen is soft. Tenderness: There is no tenderness. There is no guarding or rebound. Musculoskeletal: Normal range of motion. General: No tenderness or deformity. Skin:     General: Skin is warm and dry. Capillary Refill: Capillary refill takes less than 2 seconds. Coloration: Skin is not pale. Findings: No erythema or rash. Neurological:      General: No focal deficit present. Mental Status: He is alert and oriented to person, place, and time. GCS: GCS eye subscore is 4. GCS verbal subscore is 5. GCS motor subscore is 6. Cranial Nerves: No cranial nerve deficit. Sensory: No sensory deficit. Motor: No weakness or abnormal muscle tone. Coordination: Coordination normal.      Deep Tendon Reflexes: Reflexes are normal and symmetric. Reflexes normal.      Comments: Patient reports improvement with looking at distant objects but horizontal diplopia with looking at near objects; no facial droop or slurred speech no drift of the upper or lower extremities finger-nose heel-to-shin intact bilaterally sensation intact bilateral upper and lower extremities face and torso          Procedures     MDM     ED Course as of Jan 06 1817   Mon Jan 06, 2020   1416 EKG: This EKG is signed and interpreted by me. Rate: 76  Rhythm: Sinus  Interpretation: no acute changes and non-specific EKG  Comparison: was normal      [TG]   1417 ATTENDING PROVIDER ATTESTATION:     I have personally performed and/or participated in the history, exam, medical decision making, and procedures and agree with all pertinent clinical information unless otherwise noted. I have also reviewed and agree with the past medical, family and social history unless otherwise noted. I have discussed this patient in detail with the resident and provided the instruction and education regarding the evidence-based evaluation and treatment of headache and diploplia. History: Patient with headache and associated diploplia with binocular visit. Onset 0300    My findings: Nida Nagy is a 48 y.o. male whom is in no distress. Physical exam reveals heart is regular, lungs clear, abdomen is soft and non tender. Extremities are intact. No obvious weakness nor ataxia. EOM intact. My plan: Symptomatic and supportive care. CT, labs. Electronically signed by Farideh New DO on 1/6/20 at 2:22 PM          [TG]   2061 Attention paid to eye examination reveals that his diplopia becomes most obvious and exaggerated when he attempts to look to his left and upward.   The left eye appears to not be able to move into that left upper outer quadrant as well as the contralateral.  Pupils are equal reactive diastolic blood pressure >125 mmHg  - requires aggressive treatment is necessary to lower their blood pressure  - has symptoms suggestive of subarachnoid hemorrhage  - has had GI or urinary tract hemorrhage within the last 21 days  - has had an arterial puncture at a non-compressible site within the last 7 days  - received heparin within the last 48 hours and has an elevated PTT  - has a prothrombin time (PT) >15 seconds  - has a platelet count <533,371 uL  - serum blood glucose is <50 mg/dL or >400 mg/dL    Relative Contraindications:  - NIH Stroke score >22  - Patient's CT shows evidence of large MCA territory infarction (>1/3 the MCA territory)    *[facility] Policy Paper      Acute CVA Core Measures:     - t-PA Eligibility: IV t-PA was considered and not given due to violations in inclusion criteria including stroke onset was greater than 3 hours prior to presentation        --------------------------------------------- PAST HISTORY ---------------------------------------------  Past Medical History:  has a past medical history of Carotid stenosis, Diabetes mellitus ([address] and Emphysema. Past Surgical History:  has a past surgical history that includes Vasectomy; Tonsillectomy; Colonoscopy; and Dental surgery. Social History:  reports that he has been smoking cigarettes. He has been smoking about 1.00 pack per day. He uses smokeless tobacco. He reports current alcohol use. He reports that he does not use drugs. Family History: family history is not on file. The patients home medications have been reviewed. Allergies: Patient has no known allergies.     -------------------------------------------------- RESULTS -------------------------------------------------    Lab  Results for orders placed or performed during the hospital encounter of 01/06/20   CBC Auto Differential   Result Value Ref Range    WBC 6.1 4.5 - 11.5 E9/L    RBC 5.09 3.80 - 5.80 E12/L    Hemoglobin 17.6 (H) 12.5 - 16.5 g/dL no   evidence of acute calvarial trauma. No pathologic extra-axial fluid   collections are noted. If further imaging is felt to be clinically   warranted, MRI would better show CNS anatomy, given the absence of   acute hemorrhage or mass effect.               ------------------------- NURSING NOTES AND VITALS REVIEWED ---------------------------  Date / Time Roomed:  1/6/2020 12:49 PM  ED Bed Assignment:  [facility]    The nursing notes within the ED encounter and vital signs as below have been reviewed. Patient Vitals for the past 24 hrs:   BP Temp Temp src Pulse Resp SpO2 Height Weight   01/06/20 1527 (!) 130/92 -- -- 78 16 97 % -- --   01/06/20 1202 (!) 176/80 97.5 °F (36.4 °C) Oral 85 16 94 % 5' 10\" (1.778 m) 205 lb (93 kg)       Oxygen Saturation Interpretation: Normal      ------------------------------------------ PROGRESS NOTES     I have spoken with the patient and discussed todays results, in addition to providing specific details for the plan of care and counseling regarding the diagnosis and prognosis. Their questions are answered at this time and they are agreeable with the plan. I have discussed the risks and benefits of transfer and they wish to proceed with the transfer. --------------------------------- ADDITIONAL PROVIDER NOTES ---------------------------------  Consultations:  Spoke with LILIANA Hi (Medicine). Discussed case. They will admit this patient. Reason for transfer: Services not available at this facility. This patient's ED course included: a personal history and physicial examination, re-evaluation prior to disposition, multiple bedside re-evaluations, cardiac monitoring and continuous pulse oximetry    This patient has remained hemodynamically stable and been closely monitored during their ED course.     Please note that the withdrawal or failure to initiate urgent interventions for this patient would likely result in a life threatening deterioration or permanent disability. Clinical Impression  1. Diplopia    2. Stroke-like symptoms    3. Nonintractable headache, unspecified chronicity pattern, unspecified headache type    4. Hypertension, unspecified type          Disposition  Patient's disposition: Transfer to [facility]. Transferred by: EMS ground. Patient's condition is stable.          Candelaria Millan DO  Resident  01/06/20 [facility],   Resident  01/06/20 [id number]

## 2020-01-06 NOTE — ED NOTES
Bed:  [place] [address]  Expected date:   Expected time:   Means of arrival:   Comments:  350 Dipak Vidal RN  01/06/20 [id number]

## 2020-01-07 ENCOUNTER — APPOINTMENT (OUTPATIENT)
Dept: GENERAL RADIOLOGY | Age: 55
End: 2020-01-07
Attending: INTERNAL MEDICINE
Payer: MEDICARE

## 2020-01-07 ENCOUNTER — APPOINTMENT (OUTPATIENT)
Dept: MRI IMAGING | Age: 55
End: 2020-01-07
Attending: INTERNAL MEDICINE
Payer: MEDICARE

## 2020-01-07 ENCOUNTER — APPOINTMENT (OUTPATIENT)
Dept: CT IMAGING | Age: 55
End: 2020-01-07
Attending: INTERNAL MEDICINE
Payer: MEDICARE

## 2020-01-07 PROBLEM — R29.90 STROKE-LIKE SYMPTOMS: Status: RESOLVED | Noted: 2020-01-07 | Resolved: 2020-01-07

## 2020-01-07 PROBLEM — E66.9 OBESITY (BMI 30-39.9): Chronic | Status: ACTIVE | Noted: 2020-01-07

## 2020-01-07 PROBLEM — I10 ESSENTIAL HYPERTENSION: Chronic | Status: ACTIVE | Noted: 2020-01-07

## 2020-01-07 PROBLEM — R29.90 STROKE-LIKE SYMPTOMS: Status: ACTIVE | Noted: 2020-01-07

## 2020-01-07 PROBLEM — R29.90 STROKE-LIKE SYMPTOMS: Status: RESOLVED | Noted: 2020-01-06 | Resolved: 2020-01-07

## 2020-01-07 PROBLEM — I63.9 ACUTE CVA (CEREBROVASCULAR ACCIDENT) (HCC): Status: ACTIVE | Noted: 2020-01-07

## 2020-01-07 LAB
ANION GAP SERPL CALCULATED.3IONS-SCNC: 13 MMOL/L (ref 7–16)
BUN BLDV-MCNC: 13 MG/DL (ref 6–20)
CALCIUM SERPL-MCNC: 9 MG/DL (ref 8.6–10.2)
CHLORIDE BLD-SCNC: 104 MMOL/L (ref 98–107)
CHOLESTEROL, TOTAL: 136 MG/DL (ref 0–199)
CO2: 23 MMOL/L (ref 22–29)
CREAT SERPL-MCNC: 0.8 MG/DL (ref 0.7–1.2)
GFR AFRICAN AMERICAN: >60
GFR NON-AFRICAN AMERICAN: >60 ML/MIN/1.73
GLUCOSE BLD-MCNC: 157 MG/DL (ref 74–99)
HBA1C MFR BLD: 6.7 % (ref 4–5.6)
HCT VFR BLD CALC: 46.5 % (ref 37–54)
HDLC SERPL-MCNC: 33 MG/DL
HEMOGLOBIN: 15.9 G/DL (ref 12.5–16.5)
LDL CHOLESTEROL CALCULATED: 81 MG/DL (ref 0–99)
MCH RBC QN AUTO: 33.7 PG (ref 26–35)
MCHC RBC AUTO-ENTMCNC: 34.2 % (ref 32–34.5)
MCV RBC AUTO: 98.5 FL (ref 80–99.9)
METER GLUCOSE: 139 MG/DL (ref 74–99)
METER GLUCOSE: 149 MG/DL (ref 74–99)
METER GLUCOSE: 172 MG/DL (ref 74–99)
METER GLUCOSE: 231 MG/DL (ref 74–99)
PDW BLD-RTO: 12.7 FL (ref 11.5–15)
PLATELET # BLD: 132 E9/L (ref 130–450)
PMV BLD AUTO: 9.5 FL (ref 7–12)
POTASSIUM REFLEX MAGNESIUM: 4.5 MMOL/L (ref 3.5–5)
RBC # BLD: 4.72 E12/L (ref 3.8–5.8)
SODIUM BLD-SCNC: 140 MMOL/L (ref 132–146)
TRIGL SERPL-MCNC: 112 MG/DL (ref 0–149)
VLDLC SERPL CALC-MCNC: 22 MG/DL
WBC # BLD: 7.1 E9/L (ref 4.5–11.5)

## 2020-01-07 PROCEDURE — 36415 COLL VENOUS BLD VENIPUNCTURE: CPT

## 2020-01-07 PROCEDURE — 82962 GLUCOSE BLOOD TEST: CPT

## 2020-01-07 PROCEDURE — 6370000000 HC RX 637 (ALT 250 FOR IP): Performed by: PSYCHIATRY & NEUROLOGY

## 2020-01-07 PROCEDURE — 83036 HEMOGLOBIN GLYCOSYLATED A1C: CPT

## 2020-01-07 PROCEDURE — 97161 PT EVAL LOW COMPLEX 20 MIN: CPT

## 2020-01-07 PROCEDURE — 2580000003 HC RX 258: Performed by: NURSE PRACTITIONER

## 2020-01-07 PROCEDURE — 97129 THER IVNTJ 1ST 15 MIN: CPT | Performed by: SPEECH-LANGUAGE PATHOLOGIST

## 2020-01-07 PROCEDURE — 80048 BASIC METABOLIC PNL TOTAL CA: CPT

## 2020-01-07 PROCEDURE — 70030 X-RAY EYE FOR FOREIGN BODY: CPT

## 2020-01-07 PROCEDURE — 70498 CT ANGIOGRAPHY NECK: CPT

## 2020-01-07 PROCEDURE — G0378 HOSPITAL OBSERVATION PER HR: HCPCS

## 2020-01-07 PROCEDURE — 97165 OT EVAL LOW COMPLEX 30 MIN: CPT

## 2020-01-07 PROCEDURE — 85027 COMPLETE CBC AUTOMATED: CPT

## 2020-01-07 PROCEDURE — 99218 PR INITIAL OBSERVATION CARE/DAY 30 MINUTES: CPT | Performed by: PSYCHIATRY & NEUROLOGY

## 2020-01-07 PROCEDURE — 6370000000 HC RX 637 (ALT 250 FOR IP): Performed by: INTERNAL MEDICINE

## 2020-01-07 PROCEDURE — 6360000004 HC RX CONTRAST MEDICATION: Performed by: RADIOLOGY

## 2020-01-07 PROCEDURE — 70496 CT ANGIOGRAPHY HEAD: CPT

## 2020-01-07 PROCEDURE — 96372 THER/PROPH/DIAG INJ SC/IM: CPT

## 2020-01-07 PROCEDURE — 92523 SPEECH SOUND LANG COMPREHEN: CPT | Performed by: SPEECH-LANGUAGE PATHOLOGIST

## 2020-01-07 PROCEDURE — 6360000002 HC RX W HCPCS: Performed by: NURSE PRACTITIONER

## 2020-01-07 PROCEDURE — 70551 MRI BRAIN STEM W/O DYE: CPT

## 2020-01-07 PROCEDURE — 6370000000 HC RX 637 (ALT 250 FOR IP): Performed by: NURSE PRACTITIONER

## 2020-01-07 PROCEDURE — 80061 LIPID PANEL: CPT

## 2020-01-07 RX ORDER — CLOPIDOGREL BISULFATE 75 MG/1
75 TABLET ORAL DAILY
Status: DISCONTINUED | OUTPATIENT
Start: 2020-01-07 | End: 2020-01-08 | Stop reason: HOSPADM

## 2020-01-07 RX ORDER — DEXTROSE MONOHYDRATE 50 MG/ML
100 INJECTION, SOLUTION INTRAVENOUS PRN
Status: DISCONTINUED | OUTPATIENT
Start: 2020-01-07 | End: 2020-01-08 | Stop reason: HOSPADM

## 2020-01-07 RX ORDER — NICOTINE POLACRILEX 4 MG
15 LOZENGE BUCCAL PRN
Status: DISCONTINUED | OUTPATIENT
Start: 2020-01-07 | End: 2020-01-08 | Stop reason: HOSPADM

## 2020-01-07 RX ORDER — ATORVASTATIN CALCIUM 10 MG/1
10 TABLET, FILM COATED ORAL NIGHTLY
Status: DISCONTINUED | OUTPATIENT
Start: 2020-01-07 | End: 2020-01-08 | Stop reason: HOSPADM

## 2020-01-07 RX ORDER — DEXTROSE MONOHYDRATE 25 G/50ML
12.5 INJECTION, SOLUTION INTRAVENOUS PRN
Status: DISCONTINUED | OUTPATIENT
Start: 2020-01-07 | End: 2020-01-08 | Stop reason: HOSPADM

## 2020-01-07 RX ADMIN — DIVALPROEX SODIUM 250 MG: 250 TABLET, DELAYED RELEASE ORAL at 21:13

## 2020-01-07 RX ADMIN — TAMSULOSIN HYDROCHLORIDE 0.4 MG: 0.4 CAPSULE ORAL at 10:30

## 2020-01-07 RX ADMIN — ATORVASTATIN CALCIUM 10 MG: 10 TABLET, FILM COATED ORAL at 21:13

## 2020-01-07 RX ADMIN — CLOPIDOGREL 75 MG: 75 TABLET, FILM COATED ORAL at 19:08

## 2020-01-07 RX ADMIN — SODIUM CHLORIDE, PRESERVATIVE FREE 10 ML: 5 INJECTION INTRAVENOUS at 10:29

## 2020-01-07 RX ADMIN — LISINOPRIL 5 MG: 5 TABLET ORAL at 10:30

## 2020-01-07 RX ADMIN — ASPIRIN 81 MG 81 MG: 81 TABLET ORAL at 10:30

## 2020-01-07 RX ADMIN — METFORMIN HYDROCHLORIDE 1000 MG: 1000 TABLET ORAL at 10:30

## 2020-01-07 RX ADMIN — IOPAMIDOL 75 ML: 755 INJECTION, SOLUTION INTRAVENOUS at 17:59

## 2020-01-07 RX ADMIN — DIVALPROEX SODIUM 250 MG: 250 TABLET, DELAYED RELEASE ORAL at 10:30

## 2020-01-07 RX ADMIN — INSULIN LISPRO 1 UNITS: 100 INJECTION, SOLUTION INTRAVENOUS; SUBCUTANEOUS at 21:17

## 2020-01-07 RX ADMIN — ENOXAPARIN SODIUM 40 MG: 40 INJECTION SUBCUTANEOUS at 10:29

## 2020-01-07 RX ADMIN — SODIUM CHLORIDE, PRESERVATIVE FREE 10 ML: 5 INJECTION INTRAVENOUS at 21:13

## 2020-01-07 ASSESSMENT — PAIN SCALES - GENERAL
PAINLEVEL_OUTOF10: 0

## 2020-01-07 NOTE — PROGRESS NOTES
visuoconstructional skills, conceptual thinking, calculations, and orientation. Executive Function/ Visuospatial: 3/5  Namin/3  Memory (immediate): no score obtained; Pt demonstrated 5/5 recall of novel objets with mild difficulty. Attention: 2/6  Language: 0/3  Abstraction: 2/2  Delayed Recall: 4/5    Memory Index Score: 14/15  Orientation: 6/6    A score of 19/30 was attained. This indicates Mild as per the assessment parameters. RAW SCORE SEVERITY   26-30 Within functional   limits   18-25 Mild cognitive   impairment   10-17 Moderate cognitive impairment   <10 Severe cognitive impairment                       Prognosis for improvements is good, This plan will be re-evaluated and revised in 1 week  if warranted. , Patient stated goals: Agreed with above, Treatment goals discussed with Patient, The Patient understand the diagnosis, prognosis and plan of care. Evaluation time includes  review of current medical information, gathering information on past medical history/social history and prior level of function, completion of standardized testing/informal observation of tasks, assessment of data, and development of POC/Goals. The admitting diagnosis and active problem list, as listed below have been reviewed prior to initiation of this evaluation. ADMITTING DIAGNOSIS: Stroke-like symptoms [R29.90]  Stroke-like symptoms [R29.90]     ACTIVE PROBLEM LIST:   Patient Active Problem List   Diagnosis    Asymptomatic bilateral carotid artery stenosis    PVD [facility]peripheral vascular disease) ([facility])    Obesity (BMI 30-39. 9)    Diabetes mellitus type 2, uncontrolled [address] [address]    Essential hypertension    Acute CVA (cerebrovascular accident) [facility])       Aj Arcos M.S., [address] [facility]  Speech-Language Pathologist  [id number]  2020

## 2020-01-07 NOTE — H&P
[address] [facility]  Attending History and Physical      CHIEF COMPLAINT:  Vision changes      HISTORY OF PRESENT ILLNESS:      The patient is a 48 y.o. male patient of dr Ethridge Ormond who presents with complains of vision changes. Patient states when he looks out of both eyes he has double vision when object go to his right visual field. When either eye is covered he can see fine. He denies chest pain, shortness of breath, abdominal pain, nausea, vomiting, fevers, chills and diaphoresis. Past Medical History:    Past Medical History:   Diagnosis Date    Carotid stenosis     Diabetes mellitus ([address]     Emphysema        Past Surgical History:    Past Surgical History:   Procedure Laterality Date    COLONOSCOPY      DENTAL SURGERY      TONSILLECTOMY      VASECTOMY         Medications Prior to Admission:    Medications Prior to Admission: divalproex (DEPAKOTE) 250 MG DR tablet, Take 250 mg by mouth 2 times daily  tamsulosin (FLOMAX) 0.4 MG capsule, Take by mouth  lisinopril (PRINIVIL;ZESTRIL) 5 MG tablet, Take 5 mg by mouth daily  TOUJEO SOLOSTAR 300 UNIT/ML injection pen, INJECT 45 UNITS SUBCUTANEOUSLY ONCE A DAY  [DISCONTINUED] divalproex (DEPAKOTE) 125 MG DR tablet, Take 250 mg by mouth 3 times daily  aspirin 81 MG chewable tablet, Take 1 tablet by mouth daily  metFORMIN (GLUCOPHAGE) 500 MG tablet, Take 2 tablets by mouth 2 times daily (with meals)    Allergies:    Patient has no known allergies. Social History:    reports that he has been smoking cigarettes. He has been smoking about 1.00 pack per day. He uses smokeless tobacco. He reports current alcohol use. He reports that he does not use drugs. Family History:   family history is not on file.     REVIEW OF SYSTEMS:  As above in the HPI, otherwise negative    PHYSICAL EXAM:    Vitals:  BP (!) 104/56   Pulse 87   Temp 98.7 °F (37.1 °C) (Temporal)   Resp 16   Ht 5' 8\" (1.727 m)   Wt 213 lb 6 oz (96.8 kg)   SpO2 94% BMI 32.44 kg/m²     General:  Awake, alert, oriented X 3. Well developed, well nourished, well groomed. No apparent distress. HEENT:  Normocephalic, atraumatic. Pupils equal, round, reactive to light. No scleral icterus. No conjunctival injection. Normal lips, teeth, and gums. No nasal discharge. Neck:  Supple  Heart:  RRR, no murmurs, gallops, rubs  Lungs:  CTA bilaterally, bilat symmetrical expansion, no wheeze, rales, or rhonchi  Abdomen: Bowel sounds present, soft, nontender, no masses, no organomegaly, no peritoneal signs  Extremities:  No clubbing, cyanosis, or edema  Skin:  Warm and dry, no open lesions or rash  Neuro:  Left eye seems to deviate to midline more than needed. No other focal sensory or motor abnormalities.     Breast: deferred  Rectal: deferred  Genitalia:  deferred    LABS:    CBC with Differential:    Lab Results   Component Value Date    WBC 7.1 01/07/2020    RBC 4.72 01/07/2020    HGB 15.9 01/07/2020    HCT 46.5 01/07/2020     01/07/2020    MCV 98.5 01/07/2020    MCH 33.7 01/07/2020    MCHC 34.2 01/07/2020    RDW 12.7 01/07/2020    LYMPHOPCT 44.8 01/06/2020    MONOPCT 11.2 01/06/2020    BASOPCT 0.8 01/06/2020    MONOSABS 0.68 01/06/2020    LYMPHSABS 2.71 01/06/2020    EOSABS 0.35 01/06/2020    BASOSABS 0.05 01/06/2020     CMP:    Lab Results   Component Value Date     01/07/2020    K 4.5 01/07/2020     01/07/2020    CO2 23 01/07/2020    BUN 13 01/07/2020    CREATININE 0.8 01/07/2020    GFRAA >60 01/07/2020    LABGLOM >60 01/07/2020    GLUCOSE 157 01/07/2020    GLUCOSE 131 12/10/2010    PROT 7.4 01/06/2020    LABALBU 4.5 01/06/2020    LABALBU 3.8 12/10/2010    CALCIUM 9.0 01/07/2020    BILITOT 0.5 01/06/2020    ALKPHOS 100 01/06/2020    AST 32 01/06/2020    ALT 45 01/06/2020     BMP:    Lab Results   Component Value Date     01/07/2020    K 4.5 01/07/2020     01/07/2020    CO2 23 01/07/2020    BUN 13 01/07/2020    LABALBU 4.5 01/06/2020    LABALBU 3.8 12/10/2010    CREATININE 0.8 01/07/2020    CALCIUM 9.0 01/07/2020    GFRAA >60 01/07/2020    LABGLOM >60 01/07/2020    GLUCOSE 157 01/07/2020    GLUCOSE 131 12/10/2010     Magnesium:    Lab Results   Component Value Date    MG 1.9 06/07/2015     Phosphorus:  No results found for: PHOS  PT/INR:    Lab Results   Component Value Date    PROTIME 11.2 01/06/2020    PROTIME 13.2 12/10/2010    INR 1.0 01/06/2020     PTT:    Lab Results   Component Value Date    APTT 30.8 01/06/2020   [APTT}  Troponin:    Lab Results   Component Value Date    TROPONINI <0.01 03/30/2016     Last 3 Troponin:    Lab Results   Component Value Date    TROPONINI <0.01 03/30/2016    TROPONINI <0.01 06/08/2015    TROPONINI <0.01 06/07/2015     U/A:  No results found for: NITRITE, COLORU, PROTEINU, PHUR, LABCAST, WBCUA, RBCUA, MUCUS, TRICHOMONAS, YEAST, BACTERIA, CLARITYU, SPECGRAV, LEUKOCYTESUR, UROBILINOGEN, BILIRUBINUR, BLOODU, GLUCOSEU, AMORPHOUS  HgBA1c:    Lab Results   Component Value Date    LABA1C 6.7 01/07/2020     FLP:    Lab Results   Component Value Date    TRIG 112 01/07/2020    HDL 33 01/07/2020    LDLCALC 81 01/07/2020    LABVLDL 22 01/07/2020     TSH:  No results found for: TSH    ASSESSMENT:      Patient Active Problem List   Diagnosis    Asymptomatic bilateral carotid artery stenosis    [facility]VD [facility]peripheral vascular disease) ([facility] [address]    Obesity (BMI 30-39. 9)    Diabetes mellitus type 2, uncontrolled ([facility] [address]    Essential hypertension    Acute CVA (cerebrovascular accident) ([facility] [address]         PLAN:    Check carotid US. Stable. Continue to encourage weight loss. Blood glucose ok, continue to adjust basal/bolus insulin therapy  Blood pressure ok, continue current medications  Continue Pt/Ot and risk factor modifications. Marylu Hargrove MRI brain. Seems like eye muscle nerve palsy. Neurology and ophthalmology evaluations.     Pt/Ot evaluations for discharge planning    Christine Cornejo MD  12:13 PM  1/7/2020

## 2020-01-07 NOTE — PROGRESS NOTES
Left message with Dr. José Luis Wheeler office regarding new consult, added to care team at this time.     Laurita Padgett RN

## 2020-01-07 NOTE — PROGRESS NOTES
OCCUPATIONAL THERAPY INITIAL EVALUATION      Date:2020  Patient Name: Génesis Reeder  MRN: [id number]  : 1965  Room: [address] [place] [address]-A      Evaluating [address] [place] [address], OTR/L #[id number]    AM-PAC Daily Activity Raw Score:   Recommended Adaptive Equipment: none     Diagnosis: Stroke-like symptoms [R29.90]  Stroke-like symptoms [R29.90]     [place] Scale (MRS)  Score     Description  0             No symptoms  1             No significant disability despite symptoms  2             Slight disability; able to look after own affairs  3             Moderate disability; able to ambulate without assist/ requires assist with ADLs  4             Moderate/Severe disability;requires assist to ambulate/assist with ADLs  5             Severe disability;bedridden/incontinent   6               Score:   1  Pertinent Medical History: carotid stenosis, DM, emphysema    Precautions:  Mild diplopia     Home Living: Pt lives with girlfriend and girlfriend's 2 children in basement apt[address] (outside) [address] (inside) [address], 1 handrail  Bath/bed/kitchen in basement level   Equipment owned: n/a    Prior Level of Function: independent with ADLs , independent with IADLs; ambulated independently w/o AD  Driving: yes    Pain Level: Pt c/o no pain this session    Cognition: A&O: 4/4; Follows 1-2 step directions   Memory:  good (good recall)   Sequencing:  good    Problem solving:  good    Judgement/safety:  good      Functional Assessment:   Initial Eval Status  Date: 20 Treatment Status  Date: Short Term Goals  Treatment frequency: Evaluation Only   Feeding Independent      Grooming Independent      UB Dressing Independent      LB Dressing Modified [place]      Bathing Modified [place]     Toileting Modified [place]      Bed Mobility  Rolling: Independent   Supine to sit:  Independent   Sit to supine: Independent      Functional Transfers Independent     Functional Mobility Independent, no AD     Balance Sitting: Independent  Standing: Independent, no AD     Activity Tolerance Good     Visual/  Perceptual Glasses: yes  Pt c/o mild diplopia however does not impact function. Reinforced importance of modified techniques and safety during all functional tasks                Hand dominance: R   Strength ROM Additional Info:    RUE  5/5  WFL   good  and wfl FMC/dexterity noted during ADL tasks       LUE 5/5  WFL   good  and wfl FMC/dexterity noted during ADL tasks       Hearing: [facility]   Sensation: c/o numbness R hand  Light touch: intact B UE/LE's, face  Tone: WFL   Edema: none noted                            Comments/Treatment: Upon arrival, patient lying in bed. Pt agreeable to OT session this date. Therapist facilitated bed mobility (reinforced importance of safety w/ positional changes d/t c/o mild diplopia and occasional dizziness), functional transfers (various surfaces-EOB, toilet), standing tolerance tasks (addressing posture, balance and activity tolerance while incorporating light functional reaching) and functional ambulation task without AD (safety education provided). Therapist facilitated self-care retraining: UB/LB self-care tasks, simulated toileting task and standing grooming task while educating pt on modified techniques d/t mild visual deficit and safety. Skilled monitoring of HR, O2 sats and pts response to treatment. At end of session, patient lying in bed (per pt request) with call light and phone within reach, all lines and tubes intact. Eval Complexity: Low    Evaluation time includes thorough review of current medical information, gathering information on past medical history/social history and prior level of function, completion of standardized testing/informal observation of tasks, assessment of data, and development of POC/Goals.       Assessment of current deficits   Functional mobility []  ADLs [] Strength []  Cognition []  Functional transfers  [] IADLs [] Safety Awareness

## 2020-01-07 NOTE — PROGRESS NOTES
if medical/functional status changes. Patient education  Pt educated on purpose of PT for discharge planning, importance of mobility, safety with mobility, transfers, gait    Patient response to education:   Pt verbalized understanding Pt demonstrated skill Pt requires further education in this area   Yes  Yes  No        Pts/ family goals   1. To find out what is wrong. Patient and or family understand(s) diagnosis, prognosis, and plan of care. Yes     PLAN  PT orders will be discontinued as patient demonstrated safe transfers and gait. Thank you for the opportunity to assist in the care of this patient.       Time in: 1005  Time out: [facility], PT, DPT   License number:  [id number]

## 2020-01-08 VITALS
TEMPERATURE: 97.8 F | RESPIRATION RATE: 18 BRPM | SYSTOLIC BLOOD PRESSURE: 140 MMHG | DIASTOLIC BLOOD PRESSURE: 74 MMHG | OXYGEN SATURATION: 98 % | WEIGHT: 213.38 LBS | BODY MASS INDEX: 32.34 KG/M2 | HEART RATE: 83 BPM | HEIGHT: 68 IN

## 2020-01-08 PROBLEM — I65.02 STENOSIS OF LEFT VERTEBRAL ARTERY: Status: ACTIVE | Noted: 2020-01-08

## 2020-01-08 PROBLEM — H49.21 PALSY OF RIGHT SIXTH CRANIAL NERVE ON EXAMINATION: Status: ACTIVE | Noted: 2020-01-08

## 2020-01-08 LAB — METER GLUCOSE: 108 MG/DL (ref 74–99)

## 2020-01-08 PROCEDURE — 99226 PR SBSQ OBSERVATION CARE/DAY 35 MINUTES: CPT | Performed by: NURSE PRACTITIONER

## 2020-01-08 PROCEDURE — 6370000000 HC RX 637 (ALT 250 FOR IP): Performed by: NURSE PRACTITIONER

## 2020-01-08 PROCEDURE — 6370000000 HC RX 637 (ALT 250 FOR IP): Performed by: INTERNAL MEDICINE

## 2020-01-08 PROCEDURE — 6370000000 HC RX 637 (ALT 250 FOR IP): Performed by: PSYCHIATRY & NEUROLOGY

## 2020-01-08 PROCEDURE — 6360000002 HC RX W HCPCS: Performed by: NURSE PRACTITIONER

## 2020-01-08 PROCEDURE — 2580000003 HC RX 258: Performed by: NURSE PRACTITIONER

## 2020-01-08 PROCEDURE — 97130 THER IVNTJ EA ADDL 15 MIN: CPT

## 2020-01-08 PROCEDURE — G0378 HOSPITAL OBSERVATION PER HR: HCPCS

## 2020-01-08 PROCEDURE — 82962 GLUCOSE BLOOD TEST: CPT

## 2020-01-08 PROCEDURE — 97129 THER IVNTJ 1ST 15 MIN: CPT

## 2020-01-08 RX ORDER — CLOPIDOGREL BISULFATE 75 MG/1
75 TABLET ORAL DAILY
Qty: 30 TABLET | Refills: 0 | Status: SHIPPED | OUTPATIENT
Start: 2020-01-09

## 2020-01-08 RX ORDER — ATORVASTATIN CALCIUM 10 MG/1
10 TABLET, FILM COATED ORAL NIGHTLY
Qty: 30 TABLET | Refills: 0 | Status: SHIPPED | OUTPATIENT
Start: 2020-01-08

## 2020-01-08 RX ADMIN — CLOPIDOGREL 75 MG: 75 TABLET, FILM COATED ORAL at 08:43

## 2020-01-08 RX ADMIN — ASPIRIN 81 MG 81 MG: 81 TABLET ORAL at 08:43

## 2020-01-08 RX ADMIN — TAMSULOSIN HYDROCHLORIDE 0.4 MG: 0.4 CAPSULE ORAL at 08:49

## 2020-01-08 RX ADMIN — DIVALPROEX SODIUM 250 MG: 250 TABLET, DELAYED RELEASE ORAL at 08:43

## 2020-01-08 RX ADMIN — LISINOPRIL 5 MG: 5 TABLET ORAL at 08:43

## 2020-01-08 RX ADMIN — SODIUM CHLORIDE, PRESERVATIVE FREE 10 ML: 5 INJECTION INTRAVENOUS at 08:51

## 2020-01-08 ASSESSMENT — PAIN SCALES - GENERAL: PAINLEVEL_OUTOF10: 0

## 2020-01-08 NOTE — CONSULTS
[address]  YOB: 1965  [id number]        Re:   [address]      The patient is a 48 y.o. male who was admitted with Acute CVA (cerebrovascular accident) ([facility] [address] . This very nice gentleman stated that on Saturday he noticed doubling of his vision. He denies any other neurologic symptoms. Patient is oriented to person, place, time, and general circumstances. Past ocular history: Amblyopia left eye    Past Medical History:   Diagnosis Date    Carotid stenosis     Diabetes mellitus (HCC)     Emphysema      Past Surgical History:   Procedure Laterality Date    COLONOSCOPY      DENTAL SURGERY      TONSILLECTOMY      VASECTOMY       No Known Allergies    Medications- reviewed in chart    Review of Systems- reviewed with patient and reviewed in chart    Ophthalmic exam:    Pupils: Equally round and reactive to light   Extraocular motility: 6 cranial nerve palsy right eye. Extraocular motility in the left normal      Gross visual fields: normal     Visual Acuity with near card:  Right:  20/20     does not have reading glasses. Left:    20/30    Right eye      Anterior Segment:    lids/lashes/lacrimal system:  normal  Conjunctiva/sclera:   normal  Cornea:    normal  anterior chamber:   normal  Iris:     normal  Posterior Segment:  Lens:     normal  anterior vitreous:   normal  nerve cup/disc ratio:  0.3 normal  nerve appearance:   normal  Macula:    normal  Vessels:   normal  Periphery:    normal      Left eye:  Anterior Segment:    lids/lashes/lacimal system:  normal  Conjunctiva/sclera:   normal  Cornea:    normal  anterior chamber:   normal  Iris:     normal  Posterior Segment:  Lens:     normal  anterior vitreous:   normal  nerve cup/disc ratio:  0.3 normal  nerve appearance:   normal  Macula:    normal  Vessels:   normal  Periphery:    normal        Assessment:  1. Diplopia due to 6 cranial nerve palsy right eye.   Due to patient's

## 2020-01-08 NOTE — PROGRESS NOTES
Subjective: The patient is awake and alert. No problems overnight. Denies chest pain, angina, and dyspnea. Denies abdominal pain. Tolerating diet. No nausea or vomiting. Still with double vision. Objective:    BP (!) 142/68   Pulse 75   Temp 97.6 °F (36.4 °C) (Temporal)   Resp 18   Ht 5' 8\" (1.727 m)   Wt 213 lb 6 oz (96.8 kg)   SpO2 97%   BMI 32.44 kg/m²     Current medications that patient is taking have been reviewed. Heart:  RRR, no murmurs, gallops, or rubs.   Lungs:  CTA bilaterally, no wheeze, rales or rhonchi  Abd: bowel sounds present, soft, nontender, nondistended, no masses  Extrem:  No cyanosis or edema    CBC with Differential:    Lab Results   Component Value Date    WBC 7.1 01/07/2020    RBC 4.72 01/07/2020    HGB 15.9 01/07/2020    HCT 46.5 01/07/2020     01/07/2020    MCV 98.5 01/07/2020    MCH 33.7 01/07/2020    MCHC 34.2 01/07/2020    RDW 12.7 01/07/2020    LYMPHOPCT 44.8 01/06/2020    MONOPCT 11.2 01/06/2020    BASOPCT 0.8 01/06/2020    MONOSABS 0.68 01/06/2020    LYMPHSABS 2.71 01/06/2020    EOSABS 0.35 01/06/2020    BASOSABS 0.05 01/06/2020     CMP:    Lab Results   Component Value Date     01/07/2020    K 4.5 01/07/2020     01/07/2020    CO2 23 01/07/2020    BUN 13 01/07/2020    CREATININE 0.8 01/07/2020    GFRAA >60 01/07/2020    LABGLOM >60 01/07/2020    GLUCOSE 157 01/07/2020    GLUCOSE 131 12/10/2010    PROT 7.4 01/06/2020    LABALBU 4.5 01/06/2020    LABALBU 3.8 12/10/2010    CALCIUM 9.0 01/07/2020    BILITOT 0.5 01/06/2020    ALKPHOS 100 01/06/2020    AST 32 01/06/2020    ALT 45 01/06/2020     BMP:    Lab Results   Component Value Date     01/07/2020    K 4.5 01/07/2020     01/07/2020    CO2 23 01/07/2020    BUN 13 01/07/2020    LABALBU 4.5 01/06/2020    LABALBU 3.8 12/10/2010    CREATININE 0.8 01/07/2020    CALCIUM 9.0 01/07/2020    GFRAA >60 01/07/2020    LABGLOM >60 01/07/2020    GLUCOSE 157 01/07/2020    GLUCOSE 131 12/10/2010 Magnesium:    Lab Results   Component Value Date    MG 1.9 06/07/2015     Phosphorus:  No results found for: PHOS  PT/INR:    Lab Results   Component Value Date    PROTIME 11.2 01/06/2020    PROTIME 13.2 12/10/2010    INR 1.0 01/06/2020     PTT:    Lab Results   Component Value Date    APTT 30.8 01/06/2020   [APTT}     Assessment:    Patient Active Problem List   Diagnosis    Asymptomatic bilateral carotid artery stenosis    PVD [facility]peripheral vascular disease) ([facility] [address]    Obesity (BMI 30-39. 9)    Diabetes mellitus type 2, uncontrolled ([facility] [address]    Essential hypertension    [facility]       Plan:    Stable. Stable. Continue to encourage weight loss. Blood glucose ok, continue to adjust basal/bolus insulin therapy  Blood pressure ok, continue current medications  Continue Pt/Ot and risk factor modifications. Nuris Scherer MRI without acute ischemic changes. 6th cranial nerve palsy. Appreciate ophthalmology input. Pt/Ot evaluations for discharge planning. Ok to discharge.         Trinh Granger    9:31 AM  1/8/2020

## 2020-01-08 NOTE — PROGRESS NOTES
Felice Gomez is a 48 y.o. right handed male     Neurology is following for strokelike symptoms    PMH: Carotid stenosis, diabetes, emphysema, smoking, amblyopia left eye    Presented with diplopia while driving--he was seeing 2 white lines on the road [id number]. This was also associated with pressure-like pain behind his right eye. MRI of the brain revealed no evidence of acute stroke. CTAs showed 50% stenosis in his right ICA, 70% in his left ICA, and 80-90% in his left [place]    Ophthalmology found a 6 cranial nerve palsy in his right eye felt to be secondary due to microvascular disease.       Current Facility-Administered Medications   Medication Dose Route Frequency Provider Last Rate Last Dose    glucose (GLUTOSE) 40 % oral gel 15 g  15 g Oral PRN Nanette Whitehead MD        dextrose 50 % IV solution  12.5 g Intravenous PRN Nanette Whitehead MD        glucagon (rDNA) injection 1 mg  1 mg Intramuscular PRN Nanette Whitehead MD        dextrose 5 % solution  100 mL/hr Intravenous PRN Nanette Whitehead MD        insulin lispro (HUMALOG) injection vial 0-6 Units  0-6 Units Subcutaneous TID WC Nanette Whitehead MD        insulin lispro (HUMALOG) injection vial 0-3 Units  0-3 Units Subcutaneous Nightly Nanette Whitehead MD   1 Units at 01/07/20 2117    atorvastatin (LIPITOR) tablet 10 mg  10 mg Oral Nightly Nanette Whitehead MD   10 mg at 01/07/20 2113    clopidogrel (PLAVIX) tablet 75 mg  75 mg Oral Daily Mehlu Zabala MD   75 mg at 01/08/20 0843    enoxaparin (LOVENOX) injection 40 mg  40 mg Subcutaneous Daily Inez Baca APRN - CNP   40 mg at 01/07/20 1029    magnesium hydroxide (MILK OF MAGNESIA) 400 MG/5ML suspension 30 mL  30 mL Oral Daily PRN Merline Baca APRN - CNP        ondansetron (ZOFRAN) injection 4 mg  4 mg Intravenous Q6H PRN Merline Baca, APRN - CNP        sodium chloride flush 0.9 % injection 10 mL  10 mL Intravenous 2 times per day CASSIDY Bedoya - CNP 10 mL at 01/08/20 0851    sodium chloride flush 0.9 % injection 10 mL  10 mL Intravenous PRN Laura Buck, APRN - CNP        aspirin chewable tablet 81 mg  81 mg Oral Daily [facility], APRN - CNP   81 mg at 01/08/20 0843    lisinopril (PRINIVIL;ZESTRIL) tablet 5 mg  5 mg Oral Daily [facility], APRN - CNP   5 mg at 01/08/20 0843    metFORMIN (GLUCOPHAGE) tablet 1,000 mg  1,000 mg Oral BID WC Consuella Breaker [place], APRN - CNP   Stopped at 01/07/20 1818    tamsulosin (FLOMAX) capsule 0.4 mg  0.4 mg Oral Daily [facility], APRN - CNP   0.4 mg at 01/08/20 0849    influenza quadrivalent split vaccine (FLUZONE;FLUARIX;FLULAVAL;AFLURIA) injection 0.5 mL  0.5 mL Intramuscular Prior to discharge Jarod Manjarrez MD        divalproex (DEPAKOTE) DR tablet 250 mg  250 mg Oral BID Jarod Manjarrez MD   250 mg at 01/08/20 0277       Objective:     BP (!) 140/74   Pulse 83   Temp 97.8 °F (36.6 °C) (Temporal)   Resp 18   Ht 5' 8\" (1.727 m)   Wt 213 lb 6 oz (96.8 kg)   SpO2 98%   BMI 32.44 kg/m²     General appearance: alert, appears stated age, cooperative and no distress  Head: normocephalic, without obvious abnormality, atraumatic  Eyes: conjunctivae/corneas clear.  .  Neck: Left carotid bruit  Lungs: clear to auscultation bilaterally  Heart: Normal sinus rhythm  Extremities: normal, atraumatic, no cyanosis or edema  Pulses: 2+ and symmetric  Skin: Scabs to bilateral lower extremities      Mental Status: Alert, oriented x4    Appropriate attention/concentration  Intact fundus of knowledge  Intact memories    Speech: no dysarthria  Language: no aphasias    Cranial Nerves:  I: smell NA   II: visual acuity  NA   II: visual fields  binocular diplopia with lateral gaze bilaterally R > L   II: pupils ANA   III,VII: ptosis None   III,IV,VI: extraocular muscles  R 6th CN paresis    L eye drifts medially at times   V: mastication Normal   V: facial light touch sensation  Normal   V,VII:

## 2020-01-08 NOTE — PLAN OF CARE
Problem: HEMODYNAMIC STATUS  Goal: Patient has stable vital signs and fluid balance  Outcome: Met This Shift     Problem: COMMUNICATION IMPAIRMENT  Goal: Ability to express needs and understand communication  Outcome: Met This Shift     Problem: ACTIVITY INTOLERANCE/IMPAIRED MOBILITY  Goal: Mobility/activity is maintained at optimum level for patient  Outcome: Met This Shift

## 2020-01-08 NOTE — DISCHARGE SUMMARY
Physician Discharge Summary     Patient ID:  Jacques Chen  [id number]  86 y.o.  1965    Admit date: 1/6/2020    Discharge date and time:  1/8/2020    Admission Diagnoses:   Patient Active Problem List   Diagnosis    Asymptomatic bilateral carotid artery stenosis    [facility]    Obesity (BMI 30-39. 9)    Diabetes mellitus type 2, uncontrolled ([address]    Essential hypertension    Acute [facility]A (cerebrovascular accident) [facility])       Discharge Diagnoses: as above    Consults: neurology and ophthalmology    Procedures: see chart    Hospital Course: patient was admitted with double vision. He was evaluated for acute CVA. He was seen by neurology and ophthalmology. He was found to suffer 6th cranial nerve palsy. CVA was suspected, however, MRI was negative for acute CVA. He was seen by Pt/Ot. His risk factors were identified and modified as much as possible on an inpatient basis. He was discharged home in stable condition.       Discharge Exam:  See progress note from today    Condition:  stable    Disposition: home    Patient Instructions:   Current Discharge Medication List      START taking these medications    Details   atorvastatin (LIPITOR) 10 MG tablet Take 1 tablet by mouth nightly  Qty: 30 tablet, Refills: 0      clopidogrel (PLAVIX) 75 MG tablet Take 1 tablet by mouth daily  Qty: 30 tablet, Refills: 0         CONTINUE these medications which have NOT CHANGED    Details   divalproex (DEPAKOTE) 250 MG DR tablet Take 250 mg by mouth 2 times daily      tamsulosin (FLOMAX) 0.4 MG capsule Take by mouth      lisinopril (PRINIVIL;ZESTRIL) 5 MG tablet Take 5 mg by mouth daily      TOUJEO SOLOSTAR 300 UNIT/ML injection pen INJECT 45 UNITS SUBCUTANEOUSLY ONCE A DAY  Refills: 2      aspirin 81 MG chewable tablet Take 1 tablet by mouth daily  Qty: 30 tablet, Refills: 3      metFORMIN (GLUCOPHAGE) 500 MG tablet Take 2 tablets by mouth 2 times daily (with meals)  Qty: 120 tablet, Refills: 0           Activity: activity as tolerated  Diet: diabetic diet    Follow up with dr Usman Mann in 1 week. Follow up with dr Akbar Inman in 1 month. Follow up with your ophthalmologist in 1-2 weeks.      Note that over 30 minutes was spent in preparing discharge papers, discussing discharge with patient, medication review, etc.    Signed:  Criss Garcia    1/8/2020  9:34 AM

## 2020-01-09 ENCOUNTER — TELEPHONE (OUTPATIENT)
Dept: NEUROLOGY | Age: 55
End: 2020-01-09

## 2020-01-09 NOTE — TELEPHONE ENCOUNTER
Received fax from hospital requesting appointment for patient to be seen in Stroke Clinic. Left voice message for patient to call to schedule appointment to stroke clinic.

## 2020-09-14 ENCOUNTER — HOSPITAL ENCOUNTER (OUTPATIENT)
Dept: CARDIOLOGY | Age: 55
Discharge: HOME | End: 2020-09-14
Payer: MEDICARE

## 2020-09-14 ENCOUNTER — OFFICE VISIT (OUTPATIENT)
Dept: VASCULAR SURGERY | Age: 55
End: 2020-09-14
Payer: MEDICARE

## 2020-09-14 PROBLEM — I77.1 STENOSIS OF LEFT SUBCLAVIAN ARTERY (HCC): Status: ACTIVE | Noted: 2020-09-14

## 2020-09-14 PROCEDURE — 93880 EXTRACRANIAL BILAT STUDY: CPT

## 2020-09-14 PROCEDURE — 99214 OFFICE O/P EST MOD 30 MIN: CPT | Performed by: PHYSICIAN ASSISTANT

## 2020-09-14 PROCEDURE — 93923 UPR/LXTR ART STDY 3+ LVLS: CPT

## 2020-09-14 NOTE — PROGRESS NOTES
[facility] - [facility]    This is a pre read worksheet - prior to official physician interpretation    Tee Deutsch  1965  Date of study: 9/14/20    Indication for study:  Carotid artery stenosis  Study : Bilateral Carotid Artery Duplex Examination    Duplex examination of the RIGHT carotid artery system identifies atherosclerotic plaque. The peak systolic velocity in internal carotid artery was 125 centimeters / second. The maximum end diastolic velocity was 30 centimeters / second. The ICA/CCA ratio is 0.87. The right vertebral artery has antegrade flow. Duplex examination of the LEFT carotid artery system identifies atherosclerotic plaque. The peak systolic velocity in internal carotid artery was 191 centimeters / second. The maximum end diastolic velocity was 53 centimeters / second. The ICA/CCA ratio is 1.5. The left vertebral artery has retrograde flow.     LEFT  psv        LAST STUDY  9/9/2019  Rt 1-49  Lt 50-59

## 2020-09-14 NOTE — PROGRESS NOTES
Vascular Surgery Outpatient Followup    PCP : Qasim Watters MD    HISTORY OF PRESENT ILLNESS:    This is a 50 y.o. male who presents in fu regarding pvd and carotid disease. He denies any symptoms of localized weakness, slurred speech, or amaurosis fugax. 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 MRI was negative for an acute stroke. He denies any L UE claudication, rest pain, or open wounds. 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 He no longer has this issue. Pt states he is only able to walk about 30 feet before getting pain in b/l hips that radiates down his legs. He describes it to be a cramping and rates it to be an 8/10. Resting for several minutes makes it better and he is able to walk again. Denies LE rest pain or ulceration. 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 Denies significant pain associated with this.    States he does not see a DPM.    Past Medical History:        Diagnosis Date    Carotid stenosis     Diabetes mellitus ([address]     Emphysema      Past Surgical History:        Procedure Laterality Date    COLONOSCOPY      DENTAL SURGERY      TONSILLECTOMY      VASECTOMY       Current Medications:   Current Outpatient Medications   Medication Sig Dispense Refill    atorvastatin (LIPITOR) 10 MG tablet Take 1 tablet by mouth nightly 30 tablet 0    clopidogrel (PLAVIX) 75 MG tablet Take 1 tablet by mouth daily 30 tablet 0    tamsulosin (FLOMAX) 0.4 MG capsule Take by mouth      lisinopril (PRINIVIL;ZESTRIL) 5 MG tablet Take 5 mg by mouth daily      divalproex (DEPAKOTE) 250 MG DR tablet Take 250 mg by mouth 2 times daily      TOUJEO SOLOSTAR 300 UNIT/ML injection pen INJECT 45 UNITS SUBCUTANEOUSLY ONCE A DAY  2    aspirin 81 MG chewable tablet Take 1 tablet by mouth daily 30 tablet 3    metFORMIN (GLUCOPHAGE) 500 MG tablet Take 2 tablets by mouth 2 times daily (with meals) 120 tablet 0     No current facility-administered medications for this visit. Allergies:  Patient has no known allergies. Social History     Socioeconomic History    Marital status:      Spouse name: Not on file    Number of children: Not on file    Years of education: Not on file    Highest education level: Not on file   Occupational History    Not on file   Social Needs    Financial resource strain: Not on file    Food insecurity     Worry: Not on file     Inability: Not on file    Transportation needs     Medical: Not on file     Non-medical: Not on file   Tobacco Use    Smoking status: Current Every Day Smoker     Packs/day: 0.75     Types: Cigarettes    Smokeless tobacco: Current User   Substance and Sexual Activity    Alcohol use: Yes     Comment: rare    Drug use: No    Sexual activity: Not on file   Lifestyle    Physical activity     Days per week: Not on file     Minutes per session: Not on file    Stress: Not on file   Relationships    Social connections     Talks on phone: Not on file     Gets together: Not on file     Attends Evangelical service: Not on file     Active member of club or organization: Not on file     Attends meetings of clubs or organizations: Not on file     Relationship status: Not on file    Intimate partner violence     Fear of current or ex partner: Not on file     Emotionally abused: Not on file     Physically abused: Not on file     Forced sexual activity: Not on file   Other Topics Concern    Not on file   Social History Narrative    Not on file     Family hx  Heart disease, mi  Denies family hx of stroke.       Labs  Lab Results   Component Value Date    WBC 7.1 01/07/2020    HGB 15.9 01/07/2020    HCT 46.5 01/07/2020     01/07/2020    PROTIME 11.2 01/06/2020    INR 1.0 01/06/2020    APTT 30.8 01/06/2020    K 4.5 01/07/2020    BUN 13 01/07/2020    CREATININE 0.8 01/07/2020     PHYSICAL EXAM: CONSTITUTIONAL:   Awake, alert, cooperative  PSYCHIATRIC :  Oriented to time, place and person     Appropriate insight to disease process  EYES: Lids and lashes normal  ENT:  External ears and nose without lesions   Hearing deficits not noted  CN II _ XII intact, + glasses  NECK: Supple, symmetrical, trachea midline   Carotid bruit on left  LUNGS:  No increased work of breathing                 Clear to auscultation  CARDIOVASCULAR:  regular rate and rhythm   ABDOMEN:  soft, non-distended, non-tender   Aorta is not palpable  SKIN:   Normal skin color   Texture and turgor normal, no induration  EXTREMITIES:   R UE 3+ brachial   2+ radial  L UE 2+ brachial   1+ radial  R LE Edema absent   PT: biphasic   DP: biphasic  L LE Edema absent   PT: 2+   DP: 1+    RADIOLOGY:  R ICA <50% stenosis   R Vertebral has antegrade flow  L ICA 50-59% stenosis   L Vertebral has retrograde flow     A/P Asymptomatic Bilateral Carotid Stenosis  · Ultrasound from today shows  ? R ICA < 50% stenosis, stable from previous   ?  L ICA 50-59% stenosis, stable from previous imaging   · No indication for intervention at this time as he is assx and less than 80% stenosis  · Unlikely that his symptoms of diplopia were associated with his carotid disease  · Continue medical management with asa  · Emphasized importance of Tobacco cessation  · Discussed with patient pathophysiology of carotid stenosis and all ?s answered  · Discussed with patient symptoms of stroke, TIA and they understood to go to ER immediately if any symptoms developed  · F/u 12 months with carotid ultrasound     L Subclavian Artery stenosis  L vertebral artery retrograde flow  · Pt is assx  · L subclavian 80% stenosis on previous CTA at takeoff  · No indication for intervention as he is assx  · Continue current medical management with aspirin  · Discussed with pt tobacco use and relationship to PVD  · Pt understands tobacco use can contribute to worsening of pvd and development of limb loss   · Emphasized importance of call if develops any wounds, ulcerations, changes in appearance, claudication and/or symptoms     Sx PVD of the LE  · Pt experiencing pain to b/l lower extremities when walking  · Sxs seem more consistent with sciatic-like pain frandy since his pain is equal and he has palpable LLE pulses  · Continue Medical management with asa, statin  · Discussed with pt tobacco use and relationship to PVD  · pt currently is a smoker  · Pt understands tobacco use can contribute to worsening of pvd and development of limb loss   · Emphasized importance of foot care and to call if develops any wounds, ulcerations, changes in appearance, claudication and/or symptoms  · No intervention indicated at this time  · F/u 6 months with stress abis but asked him to call sooner with any issues  · If issues have improved, we asked him to call to cancel the 6 month fu and we can keep his annual office visit with abis and carotid US    Pt seen and plan reviewed with Dr. Pito Castro.      Karyn Patterson PA-C

## 2021-02-24 ENCOUNTER — TELEPHONE (OUTPATIENT)
Dept: VASCULAR SURGERY | Age: 56
End: 2021-02-24

## 2021-02-24 NOTE — TELEPHONE ENCOUNTER
Patient is scheduled for penile implant insertion 3-1-21 and wants to check with Dr. Frandy Reyes to make sure it's alright to proceed, please advise [phone].     I am unaware of any contraindication from my pov    Thanks pk

## 2021-03-11 ENCOUNTER — TELEPHONE (OUTPATIENT)
Dept: CARDIOLOGY | Age: 56
End: 2021-03-11

## 2021-03-11 NOTE — TELEPHONE ENCOUNTER
Left message to schedule exercise nuclear stress test.  Electronically signed by Veena Ricci on 3/11/2021 at 1:58 PM

## 2021-03-12 ENCOUNTER — TELEPHONE (OUTPATIENT)
Dept: VASCULAR SURGERY | Age: 56
End: 2021-03-12

## 2021-03-12 NOTE — TELEPHONE ENCOUNTER
Called to confirm appointment for 3/15/21 at 3 p.m, left message with date, time, and phone number for patient.

## 2021-03-15 ENCOUNTER — OFFICE VISIT (OUTPATIENT)
Dept: VASCULAR SURGERY | Age: 56
End: 2021-03-15
Payer: MEDICARE

## 2021-03-15 ENCOUNTER — HOSPITAL ENCOUNTER (OUTPATIENT)
Dept: CARDIOLOGY | Age: 56
Discharge: HOME | End: 2021-03-15
Payer: MEDICARE

## 2021-03-15 VITALS — HEIGHT: 68 IN | WEIGHT: 210 LBS | BODY MASS INDEX: 31.83 KG/M2

## 2021-03-15 DIAGNOSIS — I73.9 PVD (PERIPHERAL VASCULAR DISEASE) (HCC): Primary | ICD-10-CM

## 2021-03-15 DIAGNOSIS — I77.1 STENOSIS OF LEFT SUBCLAVIAN ARTERY (HCC): ICD-10-CM

## 2021-03-15 DIAGNOSIS — I73.9 PVD (PERIPHERAL VASCULAR DISEASE) (HCC): ICD-10-CM

## 2021-03-15 DIAGNOSIS — I65.23 ASYMPTOMATIC BILATERAL CAROTID ARTERY STENOSIS: ICD-10-CM

## 2021-03-15 DIAGNOSIS — I65.02 STENOSIS OF LEFT VERTEBRAL ARTERY: ICD-10-CM

## 2021-03-15 PROCEDURE — 93923 UPR/LXTR ART STDY 3+ LVLS: CPT

## 2021-03-15 PROCEDURE — 99214 OFFICE O/P EST MOD 30 MIN: CPT | Performed by: SURGERY

## 2021-03-15 RX ORDER — UBIDECARENONE 75 MG
50 CAPSULE ORAL DAILY
COMMUNITY

## 2021-03-15 NOTE — PROGRESS NOTES
Vascular Surgery Outpatient Followup    PCP : Maame Mendez MD   Urologist: Dr. Petersen Quiet:    This is a 49 y.o. male who presents in fu regarding pvd and carotid disease. He denies any symptoms of localized weakness, slurred speech, or amaurosis fugax. 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 MRI was negative for an acute stroke. When he raises his chin up to shave he gets light headed. He denies any L UE claudication, rest pain, or open wounds. He had been experiencing RUE numbnes and tingling in his fingers tips when operating machinery at work or certain position of his arm which has recurred. He is now able to walk half a block before he has to stop due to bilateral lower extremity pain. Previously he could only go 30 feet. His pain remains the same in bilateral hips that radiates down his legs starting more laterally on his hips rating down to his lateral thigh and then crossing over his knee and going down the medial calf to the foot. Resting makes it better. 8/10 pain. The pain is crampy, aching, sore. Denies LE rest pain or ulceration. Patient chronically has erectile dysfunction. He has been dealing with this for many years, and states he has tried Cialis and Viagra without success. He was supposed to have a pump placed recently which was canceled due to them wanting to have a stress test done prior to intervention. 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 Denies significant pain associated with this.    States he does not see a DPM.    Past Medical History:        Diagnosis Date    Carotid stenosis     Diabetes mellitus ([address]     Emphysema      Past Surgical History:        Procedure Laterality Date    COLONOSCOPY      DENTAL SURGERY      TONSILLECTOMY      VASECTOMY       Current Medications:   Current Outpatient Medications Medication Sig Dispense Refill    SITagliptin (JANUVIA) 100 MG tablet Take 100 mg by mouth daily      vitamin B-12 (CYANOCOBALAMIN) 100 MCG tablet Take 50 mcg by mouth daily      atorvastatin (LIPITOR) 10 MG tablet Take 1 tablet by mouth nightly 30 tablet 0    clopidogrel (PLAVIX) 75 MG tablet Take 1 tablet by mouth daily 30 tablet 0    tamsulosin (FLOMAX) 0.4 MG capsule Take by mouth      lisinopril (PRINIVIL;ZESTRIL) 5 MG tablet Take 5 mg by mouth daily      divalproex (DEPAKOTE) 250 MG DR tablet Take 250 mg by mouth 2 times daily      TOUJEO SOLOSTAR 300 UNIT/ML injection pen INJECT 45 UNITS SUBCUTANEOUSLY ONCE A DAY  2    aspirin 81 MG chewable tablet Take 1 tablet by mouth daily 30 tablet 3    metFORMIN (GLUCOPHAGE) 500 MG tablet Take 2 tablets by mouth 2 times daily (with meals) 120 tablet 0     No current facility-administered medications for this visit. Allergies:  Patient has no known allergies.   Social History     Socioeconomic History    Marital status:      Spouse name: Not on file    Number of children: Not on file    Years of education: Not on file    Highest education level: Not on file   Occupational History    Not on file   Social Needs    Financial resource strain: Not on file    Food insecurity     Worry: Not on file     Inability: Not on file    Transportation needs     Medical: Not on file     Non-medical: Not on file   Tobacco Use    Smoking status: Current Every Day Smoker     Packs/day: 0.75     Types: Cigarettes    Smokeless tobacco: Current User   Substance and Sexual Activity    Alcohol use: Yes     Comment: rare    Drug use: No    Sexual activity: Not on file   Lifestyle    Physical activity     Days per week: Not on file     Minutes per session: Not on file    Stress: Not on file   Relationships    Social connections     Talks on phone: Not on file     Gets together: Not on file     Attends Sabianist service: Not on file     Active member of club or organization: Not on file     Attends meetings of clubs or organizations: Not on file     Relationship status: Not on file    Intimate partner violence     Fear of current or ex partner: Not on file     Emotionally abused: Not on file     Physically abused: Not on file     Forced sexual activity: Not on file   Other Topics Concern    Not on file   Social History Narrative    Not on file     Family hx  Heart disease, mi  Denies family hx of stroke. Labs  Lab Results   Component Value Date    WBC 7.1 01/07/2020    HGB 15.9 01/07/2020    HCT 46.5 01/07/2020     01/07/2020    PROTIME 11.2 01/06/2020    INR 1.0 01/06/2020    APTT 30.8 01/06/2020    K 4.5 01/07/2020    BUN 13 01/07/2020    CREATININE 0.8 01/07/2020     PHYSICAL EXAM:    CONSTITUTIONAL:   Awake, alert, cooperative  PSYCHIATRIC :  Oriented to time, place and person     Appropriate insight to disease process  EYES: Lids and lashes normal  ENT:  External ears and nose without lesions   Hearing deficits not noted  CN II _ XII intact, + glasses  NECK: Supple, symmetrical, trachea midline   Carotid bruit on left  LUNGS:  No increased work of breathing                 Clear to auscultation  CARDIOVASCULAR:  regular rate and rhythm   ABDOMEN:  soft, non-distended, non-tender   Aorta is not palpable  SKIN:   Normal skin color   Texture and turgor normal, no induration  EXTREMITIES:   R UE 3+ brachial   2+ radial  L UE 2+ brachial   1+ radial  R LE Edema absent  L LE Edema absent    RADIOLOGY:  R ICA <50% stenosis   R Vertebral has antegrade flow  L ICA 50-59% stenosis   L Vertebral has retrograde flow     A/P Asymptomatic Bilateral Carotid Stenosis  · Ultrasound from previous  ? R ICA < 50% stenosis, stable from previous   ?  L ICA 50-59% stenosis, stable from previous imaging   · No indication for intervention at this time as he is assx and less than 80% stenosis  · Unlikely that his symptoms of diplopia were associated with his carotid disease  · Continue medical management with asa  · Emphasized importance of Tobacco cessation  · Discussed with patient pathophysiology of carotid stenosis and all ?s answered  · Discussed with patient symptoms of stroke, TIA and they understood to go to ER immediately if any symptoms developed  · F/u 6 months with carotid ultrasound     L Subclavian Artery stenosis  L vertebral artery retrograde flow  · Pt is likely sx when he raises his chin and gets dizzy  · If issues become more significant he understands to call and let me know  · L subclavian 80% stenosis on previous CTA at takeoff  · Continue current medical management with aspirin  · Discussed with pt tobacco use and relationship to PVD  · Pt understands tobacco use can contribute to worsening of pvd and development of limb loss   · Emphasized importance of call if develops any wounds, ulcerations, changes in appearance, claudication and/or symptoms     Sx PVD of the LE  Erectile dysfunction  · Pt experiencing pain to b/l lower extremities when walking  · Sxs seem more consistent with sciatic-like pain frandy since his pain is equal and he has palpable LLE pulses  · It is possible that if he has significant and profound internal iliac artery disease that this could explain his symptoms in light of his left lower extremity ABIs and PVRs  · His right lower extremity does note some significant arterial occlusive disease with a significant drop in the JOSE after exercise  · His left lower extremity did not note significant arterial occlusive disease and did not significantly drop in the JOSE after exercise  · If he does have significant internal iliac artery disease this could also explain his erectile dysfunction  · CTA abdomen and pelvis with runoff to evaluate the internal iliac artery disease in particular  · We will discuss with Dr. Lucy Hickey. [place] once results are obtained - ?  Of whether patient may benefit from vascular intervention that could help his ED and possibly his walking  · Continue Medical management with asa, statin  · Discussed with pt tobacco use and relationship to PVD  · pt currently is a smoker  · Pt understands tobacco use can contribute to worsening of pvd and development of limb loss   · Emphasized importance of foot care and to call if develops any wounds, ulcerations, changes in appearance, claudication and/or symptoms  · Will call patient with results of CT once completed    Bilateral hip pain  · Work-up per patient's primary  · Encouraged him to discuss with at next follow-up appointment  · underlying etiology of symptoms could be more orthoapedic related, bony    Pratheek Fleet Ards

## 2021-03-19 ENCOUNTER — TELEPHONE (OUTPATIENT)
Dept: CARDIOLOGY | Age: 56
End: 2021-03-19

## 2021-03-19 NOTE — TELEPHONE ENCOUNTER
Message left on patient's home answering machine , reminding of Stress appointment on 3/23 @ [facility]. Instructed to check Blood sugar in the am and to hold diabetic medications in the am .Covid Instructions given .  Requested return phone call

## 2021-03-23 ENCOUNTER — HOSPITAL ENCOUNTER (OUTPATIENT)
Dept: CARDIOLOGY | Age: 56
Discharge: HOME | End: 2021-03-23
Payer: MEDICARE

## 2021-03-23 VITALS
RESPIRATION RATE: 16 BRPM | SYSTOLIC BLOOD PRESSURE: 114 MMHG | BODY MASS INDEX: 31.83 KG/M2 | TEMPERATURE: 97.7 F | WEIGHT: 210 LBS | HEIGHT: 68 IN | HEART RATE: 62 BPM | DIASTOLIC BLOOD PRESSURE: 68 MMHG

## 2021-03-23 DIAGNOSIS — R06.00 DYSPNEA, UNSPECIFIED TYPE: Primary | ICD-10-CM

## 2021-03-23 PROCEDURE — 93017 CV STRESS TEST TRACING ONLY: CPT

## 2021-03-23 PROCEDURE — A9500 TC99M SESTAMIBI: HCPCS | Performed by: INTERNAL MEDICINE

## 2021-03-23 PROCEDURE — 2580000003 HC RX 258: Performed by: INTERNAL MEDICINE

## 2021-03-23 PROCEDURE — 3430000000 HC RX DIAGNOSTIC RADIOPHARMACEUTICAL: Performed by: INTERNAL MEDICINE

## 2021-03-23 PROCEDURE — 6360000002 HC RX W HCPCS: Performed by: FAMILY MEDICINE

## 2021-03-23 PROCEDURE — 78452 HT MUSCLE IMAGE SPECT MULT: CPT

## 2021-03-23 RX ORDER — GABAPENTIN 300 MG/1
300 CAPSULE ORAL DAILY
COMMUNITY
Start: 2021-02-08

## 2021-03-23 RX ORDER — SODIUM CHLORIDE 0.9 % (FLUSH) 0.9 %
10 SYRINGE (ML) INJECTION PRN
Status: DISCONTINUED | OUTPATIENT
Start: 2021-03-23 | End: 2021-03-24 | Stop reason: HOSPADM

## 2021-03-23 RX ORDER — BLOOD-GLUCOSE METER
EACH MISCELLANEOUS
COMMUNITY
Start: 2020-12-16

## 2021-03-23 RX ADMIN — SODIUM CHLORIDE, PRESERVATIVE FREE 10 ML: 5 INJECTION INTRAVENOUS at 10:13

## 2021-03-23 RX ADMIN — SODIUM CHLORIDE, PRESERVATIVE FREE 10 ML: 5 INJECTION INTRAVENOUS at 08:09

## 2021-03-23 RX ADMIN — Medication 9.8 MILLICURIE: at 08:09

## 2021-03-23 RX ADMIN — Medication 30.6 MILLICURIE: at 10:13

## 2021-03-23 RX ADMIN — REGADENOSON 0.4 MG: 0.08 INJECTION, SOLUTION INTRAVENOUS at 10:13

## 2021-03-23 RX ADMIN — SODIUM CHLORIDE, PRESERVATIVE FREE 10 ML: 5 INJECTION INTRAVENOUS at 10:14

## 2021-03-23 NOTE — PROCEDURES
inferolateral and basal inferior wall(s) that was  small sized by quantification. The resting images reveal complete reversibility. Gated SPECT left ventricular ejection fraction was calculated to be 66%, with normal myocardial thickening and wall motion. Impression:    1. ECG during the infusion did not change. 2. The myocardial perfusion imaging was abnormal.    3. Overall left ventricular systolic function was normal without regional wall motion abnormalities. 4. Low risk pre-operative pharmacologic nuclear stress test.    Thank you for sending your patient to this [facility].      Electronically signed by Sherwin Mcgregor MD on 3/23/21 at 12:46 PM EDT

## 2021-03-25 RX ORDER — METRONIDAZOLE 10 MG/G
GEL TOPICAL DAILY
COMMUNITY
End: 2021-09-21

## 2021-03-25 RX ORDER — ALBUTEROL SULFATE 90 UG/1
2 AEROSOL, METERED RESPIRATORY (INHALATION) EVERY 6 HOURS PRN
COMMUNITY

## 2021-03-29 ENCOUNTER — TELEPHONE (OUTPATIENT)
Dept: VASCULAR SURGERY | Age: 56
End: 2021-03-29

## 2021-03-29 DIAGNOSIS — I70.223 ATHEROSCLEROSIS OF NATIVE ARTERY OF BOTH LOWER EXTREMITIES WITH REST PAIN (HCC): Primary | ICD-10-CM

## 2021-03-29 DIAGNOSIS — I73.9 PVD (PERIPHERAL VASCULAR DISEASE) (HCC): ICD-10-CM

## 2021-04-09 ENCOUNTER — HOSPITAL ENCOUNTER (OUTPATIENT)
Age: 56
Discharge: HOME | End: 2021-04-09
Payer: MEDICARE

## 2021-04-09 ENCOUNTER — HOSPITAL ENCOUNTER (OUTPATIENT)
Dept: CT IMAGING | Age: 56
Discharge: HOME | End: 2021-04-11
Payer: MEDICARE

## 2021-04-09 DIAGNOSIS — I70.223 ATHEROSCLEROSIS OF NATIVE ARTERY OF BOTH LOWER EXTREMITIES WITH REST PAIN (HCC): ICD-10-CM

## 2021-04-09 LAB
ANION GAP SERPL CALCULATED.3IONS-SCNC: 7 MMOL/L (ref 7–16)
BUN BLDV-MCNC: 12 MG/DL (ref 6–20)
CALCIUM SERPL-MCNC: 9.5 MG/DL (ref 8.6–10.2)
CHLORIDE BLD-SCNC: 105 MMOL/L (ref 98–107)
CO2: 25 MMOL/L (ref 22–29)
CREAT SERPL-MCNC: 0.7 MG/DL (ref 0.7–1.2)
GFR AFRICAN AMERICAN: >60
GFR NON-AFRICAN AMERICAN: >60 ML/MIN/1.73
GLUCOSE BLD-MCNC: 114 MG/DL (ref 74–99)
POTASSIUM SERPL-SCNC: 4.8 MMOL/L (ref 3.5–5)
SODIUM BLD-SCNC: 137 MMOL/L (ref 132–146)

## 2021-04-09 PROCEDURE — 36415 COLL VENOUS BLD VENIPUNCTURE: CPT

## 2021-04-09 PROCEDURE — 75635 CT ANGIO ABDOMINAL ARTERIES: CPT

## 2021-04-09 PROCEDURE — 80048 BASIC METABOLIC PNL TOTAL CA: CPT

## 2021-04-09 PROCEDURE — 6360000004 HC RX CONTRAST MEDICATION: Performed by: RADIOLOGY

## 2021-04-09 RX ADMIN — IOPAMIDOL 75 ML: 755 INJECTION, SOLUTION INTRAVENOUS at 11:15

## 2021-04-29 ENCOUNTER — TELEPHONE (OUTPATIENT)
Dept: VASCULAR SURGERY | Age: 56
End: 2021-04-29

## 2021-07-09 ENCOUNTER — TELEPHONE (OUTPATIENT)
Dept: VASCULAR SURGERY | Age: 56
End: 2021-07-09

## 2021-07-12 ENCOUNTER — OFFICE VISIT (OUTPATIENT)
Dept: VASCULAR SURGERY | Age: 56
End: 2021-07-12
Payer: MEDICARE

## 2021-07-12 VITALS — HEIGHT: 68 IN | BODY MASS INDEX: 31.67 KG/M2 | WEIGHT: 209 LBS

## 2021-07-12 DIAGNOSIS — I73.9 PVD (PERIPHERAL VASCULAR DISEASE) WITH CLAUDICATION (HCC): Primary | ICD-10-CM

## 2021-07-12 DIAGNOSIS — I77.1 STENOSIS OF LEFT SUBCLAVIAN ARTERY (HCC): ICD-10-CM

## 2021-07-12 DIAGNOSIS — I65.23 ASYMPTOMATIC BILATERAL CAROTID ARTERY STENOSIS: ICD-10-CM

## 2021-07-12 PROCEDURE — 99214 OFFICE O/P EST MOD 30 MIN: CPT | Performed by: SURGERY

## 2021-07-12 NOTE — PROGRESS NOTES
Vascular Surgery Outpatient Followup    PCP : Mckay Riojas MD   Urologist: Dr. Ene Montejo:    This is a 49 y.o. male who presents in fu regarding pvd and carotid disease. He denies any new symptoms of stroke, tia, localized weakness, slurred speech, or amaurosis fugax. 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 MRI was negative for an acute stroke. When he raises his chin up to shave he gets light headed which continues to be an issue. He denies any L UE claudication, rest pain, or open wounds. 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 He is now able to walk about 200 feet (previously 1/2 a block) before he has to stop due to bilateral lower extremity pain. His pain remains the same in bilateral hips that radiates down his legs starting more laterally on his hips rating down to his lateral thigh and then crossing over his knee and going down the medial calf to the foot. Resting makes it better. 8/10 pain. The pain is crampy, aching, sore. Denies LE rest pain or ulceration. Patient chronically has erectile dysfunction. He has been dealing with this for many years, and states he has tried Cialis and Viagra without success. He is supposed to have a pump 7/2021. 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 Denies significant pain associated with this.    States he does not see a DPM.    Past Medical History:        Diagnosis Date    Carotid stenosis     Diabetes mellitus ([address]     Emphysema      Past Surgical History:        Procedure Laterality Date    COLONOSCOPY      DENTAL SURGERY      TONSILLECTOMY      VASECTOMY       Current Medications:   Current Outpatient Medications   Medication Sig Dispense Refill Not on file   Social History Narrative    Not on file     Social Determinants of Health     Financial Resource Strain:     Difficulty of Paying Living Expenses:    Food Insecurity:     Worried About Running Out of Food in the Last Year:     [address] in the Last Year:    Transportation Needs:     Lack of Transportation (Medical):  Lack of Transportation (Non-Medical):    Physical Activity:     Days of Exercise per Week:     Minutes of Exercise per Session:    Stress:     Feeling of Stress :    Social Connections:     Frequency of Communication with Friends and Family:     Frequency of Social Gatherings with Friends and Family:     Attends [employer] Services:     Active Member of Clubs or Organizations:     Attends Club or Organization Meetings:     Marital Status:    Intimate Partner Violence:     Fear of Current or Ex-Partner:     Emotionally Abused:     Physically Abused:     Sexually Abused:      Family hx  Heart disease, mi  Denies family hx of stroke.       Labs  Lab Results   Component Value Date    WBC 7.1 01/07/2020    HGB 15.9 01/07/2020    HCT 46.5 01/07/2020     01/07/2020    PROTIME 11.2 01/06/2020    INR 1.0 01/06/2020    APTT 30.8 01/06/2020    K 4.8 04/09/2021    BUN 12 04/09/2021    CREATININE 0.7 04/09/2021     PHYSICAL EXAM:    CONSTITUTIONAL:   Awake, alert, cooperative  PSYCHIATRIC :  Oriented to time, place and person     Appropriate insight to disease process  EYES: Lids and lashes normal  ENT:  External ears and nose without lesions   Hearing deficits not noted  CN II _ XII intact, + glasses  NECK: Supple, symmetrical, trachea midline   Carotid bruit on left  LUNGS:  No increased work of breathing                 Clear to auscultation  CARDIOVASCULAR:  regular rate and rhythm   ABDOMEN:  soft, non-distended, non-tender   Aorta is not palpable  SKIN:   Normal skin color   Texture and turgor normal, no induration  EXTREMITIES:   R UE 3+ brachial   2+ radial  L UE 1+ brachial   biphasic radial  R LE Edema absent   DP and PT monophasic  L LE Edema absent   DP and PT weaklybiphasic    RADIOLOGY:  Carotid us 9/14/20  R ICA <50% stenosis   R Vertebral has antegrade flow  L ICA 50-59% stenosis   L Vertebral has retrograde flow     A/P Asymptomatic Bilateral Carotid Stenosis  · Ultrasound from previous  ? R ICA < 50% stenosis, stable from previous   ?  L ICA 50-59% stenosis, stable from previous imaging   · No indication for intervention at this time as he is assx and less than 80% stenosis  · Unlikely that his symptoms of diplopia were associated with his carotid disease  · Continue medical management with asa  · Emphasized importance of Tobacco cessation  · Discussed with patient pathophysiology of carotid stenosis and all ?s answered  · Discussed with patient symptoms of stroke, TIA and they understood to go to ER immediately if any symptoms developed  · F/u 6 months with carotid ultrasound     L Subclavian Artery stenosis  L vertebral artery retrograde flow  · Pt is likely sx when he raises his chin and gets dizzy  · If issues become more significant he understands to call and let me know  · L subclavian 80% stenosis on previous CTA at takeoff  · Continue current medical management with aspirin  · Discussed with pt tobacco use and relationship to PVD  · Pt understands tobacco use can contribute to worsening of pvd and development of limb loss   · Emphasized importance of call if develops any wounds, ulcerations, changes in appearance, claudication and/or symptoms     Sx PVD of the LE  Erectile dysfunction  · Pt experiencing pain to b/l lower extremities when walking  · He has significant internal iliac disease bilaterally as well as fem pop arterial occlusive disease  · This likely explain his symptoms in light of his left lower extremity ABIs and PVRs  · His right lower extremity does note some significant arterial occlusive disease with a significant drop in the JOSE after exercise  · His left lower extremity did not note significant arterial occlusive disease and did not significantly drop in the JOSE after exercise  · Significant internal iliac artery disease this could also explain his erectile dysfunction  · I did discuss with him re holding off on pump placement but he would like to go forward  · Continue Medical management with asa, statin  · Discussed with pt tobacco use and relationship to PVD  · pt currently is a smoker  · Pt understands tobacco use can contribute to worsening of pvd and development of limb loss   · Emphasized importance of foot care and to call if develops any wounds, ulcerations, changes in appearance, claudication and/or symptoms  · He would like to go forward with LLE angiogram with intervention in ~ 2 months - once completely healed up from pump insertion  · He is down to 1 cigarette and understands he must have quit prior to procedure  I reviewed the procedure with the patient and family as available. I discussed the procedure, risks, benefits, complications, and alternatives of the procedure. They understand and consent.   All questions were answered    Bilateral hip pain  · Work-up per patient's primary  · Encouraged him to discuss with at next follow-up appointment  · underlying etiology of symptoms could be more orthoapedic related, bony    Lisa Man MD

## 2021-07-13 NOTE — PATIENT INSTRUCTIONS
Patient Education        Learning About Peripheral Arterial Disease (PAD)  What is peripheral arterial disease? Peripheral arterial disease (PAD) is narrowing or blockage of arteries that causes poor blood flow to your arms and legs. PAD is most common in the legs. The most common cause of PAD is the buildup of plaque on the inside of arteries. Over time, plaque builds up in the walls of the arteries, including those that supply blood to your legs. If you have PAD, you're likely to have plaque in other arteries in your body. This raises your risk of a heart attack and stroke. Medicines and lifestyle changes may lower your risk of heart attack and stroke. They may also help if you have symptoms. In some cases, surgery or other treatment is needed. Peripheral arterial disease is also called peripheral vascular disease. What are the symptoms? Many people who have PAD don't have symptoms. If you have symptoms, they may include a tight, aching, or squeezing pain in your calf, thigh, or buttock. This pain is called intermittent claudication. It usually happens after you have walked a certain distance. The pain goes away if you stop walking. As PAD gets worse, you may have pain in your foot or toe when you aren't walking. Other symptoms may include weak or tired legs. You might have trouble walking or balancing. If PAD gets worse, you may have other symptoms caused by poor blood flow to your legs and feet. These symptoms aren't common. They may include cold or numb feet or toes, sores that are slow to heal, or leg or foot pain when you're at rest.  How can you prevent PAD? · Quit smoking. Quitting smoking is one of the best things you can do to help prevent PAD. If you need help quitting, talk to your doctor about stop-smoking programs and medicines. These can increase your chances of quitting for good. · Stay at a healthy weight.   · Manage other health problems, including diabetes, high blood pressure, and high cholesterol. · Be physically active. Try to do moderate activity at least 2½ hours a week. Or try to do vigorous activity at least 1¼ hours a week. You may want to walk or try other activities, such as running, swimming, cycling, or playing tennis or team sports. · Eat a variety of heart-healthy foods. ? Eat fruits, vegetables, whole grains, beans, and other high-fiber foods. ? Eat lean proteins, such as seafood, lean meats, beans, nuts, and soy products. ? Eat healthy fats, such as canola and olive oil. ? Choose foods that are low in saturated fat and avoid trans fat. ? Limit sodium and alcohol. ? Limit drinks and foods with added sugar. How is PAD treated? Treatment for PAD focuses on relieving symptoms and lowering your risk of heart attack and stroke. Making healthy lifestyle changes can help you lower this risk. · If you smoke, quit. Quitting is the best thing you can do when you have PAD. Medicines and counseling can help you quit for good. · Get regular exercise (if your doctor says it's safe). Try walking, swimming, or biking for at least 30 minutes on most, if not all, days of the week. If you have leg symptoms when you exercise, your doctor might recommend a specialized exercise program that may relieve symptoms. The goal is to be able to walk farther without pain. · Eat heart-healthy foods, such as vegetables, fruits, nuts, beans, fish, and whole grains. Limit foods that have a lot of salt, fat, and sugar. · Stay at a healthy weight. Lose weight if you need to. You may need medicines to help lower your risk of heart attack and stroke. These include medicine to prevent blood clots, improve cholesterol, or lower blood pressure. You also may take a medicine that can help ease pain while you are walking. People who have severe PAD may have bypass surgery or other procedures (such as angioplasty) to restore proper blood flow to the legs.   Follow-up care is a key part of your treatment and safety. Be sure to make and go to all appointments, and call your doctor if you are having problems. It's also a good idea to know your test results and keep a list of the medicines you take. Where can you learn more? Go to [email or web address] org and sign in to your [employer] account. Enter H818 in the OneName box to learn more about \"Learning About Peripheral Arterial Disease (PAD). \"     If you do not have an account, please click on the \"Sign Up Now\" link. Current as of: August 31, 2020               Content Version: 12.9  © 5253-4842 Healthwise, Incorporated. Care instructions adapted under license by [facility] [place] ([facility]). If you have questions about a medical condition or this instruction, always ask your healthcare professional. [address] any warranty or liability for your use of this information.

## 2021-07-22 ENCOUNTER — HOSPITAL ENCOUNTER (OUTPATIENT)
Age: 56
Discharge: HOME | End: 2021-07-24

## 2021-07-22 LAB
ANION GAP SERPL CALCULATED.3IONS-SCNC: 12 MMOL/L (ref 7–16)
BUN BLDV-MCNC: 14 MG/DL (ref 6–20)
CALCIUM SERPL-MCNC: 8.2 MG/DL (ref 8.6–10.2)
CHLORIDE BLD-SCNC: 105 MMOL/L (ref 98–107)
CO2: 21 MMOL/L (ref 22–29)
CREAT SERPL-MCNC: 0.6 MG/DL (ref 0.7–1.2)
GFR AFRICAN AMERICAN: >60
GFR NON-AFRICAN AMERICAN: >60 ML/MIN/1.73
GLUCOSE BLD-MCNC: 279 MG/DL (ref 74–99)
HCT VFR BLD CALC: 43 % (ref 37–54)
HEMOGLOBIN: 14.6 G/DL (ref 12.5–16.5)
MCH RBC QN AUTO: 34.9 PG (ref 26–35)
MCHC RBC AUTO-ENTMCNC: 34 % (ref 32–34.5)
MCV RBC AUTO: 102.9 FL (ref 80–99.9)
PDW BLD-RTO: 13.2 FL (ref 11.5–15)
PLATELET # BLD: 116 E9/L (ref 130–450)
PMV BLD AUTO: 10 FL (ref 7–12)
POTASSIUM SERPL-SCNC: 4.2 MMOL/L (ref 3.5–5)
RBC # BLD: 4.18 E12/L (ref 3.8–5.8)
SODIUM BLD-SCNC: 138 MMOL/L (ref 132–146)
WBC # BLD: 8.4 E9/L (ref 4.5–11.5)

## 2021-07-22 PROCEDURE — 85027 COMPLETE CBC AUTOMATED: CPT

## 2021-07-22 PROCEDURE — 80048 BASIC METABOLIC PNL TOTAL CA: CPT

## 2021-09-20 ENCOUNTER — TELEPHONE (OUTPATIENT)
Dept: CARDIAC CATH/INVASIVE PROCEDURES | Age: 56
End: 2021-09-20

## 2021-09-20 PROBLEM — I73.9 PVD (PERIPHERAL VASCULAR DISEASE) WITH CLAUDICATION (HCC): Chronic | Status: ACTIVE | Noted: 2019-02-11

## 2021-09-21 ENCOUNTER — HOSPITAL ENCOUNTER (OUTPATIENT)
Dept: CARDIAC CATH/INVASIVE PROCEDURES | Age: 56
Discharge: HOME | End: 2021-09-21
Attending: SURGERY | Admitting: SURGERY
Payer: MEDICARE

## 2021-09-21 VITALS
HEART RATE: 77 BPM | WEIGHT: 209 LBS | HEIGHT: 68 IN | BODY MASS INDEX: 31.67 KG/M2 | DIASTOLIC BLOOD PRESSURE: 65 MMHG | SYSTOLIC BLOOD PRESSURE: 83 MMHG | TEMPERATURE: 97.7 F | RESPIRATION RATE: 18 BRPM | OXYGEN SATURATION: 97 %

## 2021-09-21 DIAGNOSIS — I73.9 PVD (PERIPHERAL VASCULAR DISEASE) (HCC): ICD-10-CM

## 2021-09-21 LAB
ABO/RH: NORMAL
ANION GAP SERPL CALCULATED.3IONS-SCNC: 12 MMOL/L (ref 7–16)
ANTIBODY SCREEN: NORMAL
BUN BLDV-MCNC: 12 MG/DL (ref 6–20)
CALCIUM SERPL-MCNC: 9.1 MG/DL (ref 8.6–10.2)
CHLORIDE BLD-SCNC: 105 MMOL/L (ref 98–107)
CO2: 21 MMOL/L (ref 22–29)
CREAT SERPL-MCNC: 0.6 MG/DL (ref 0.7–1.2)
GFR AFRICAN AMERICAN: >60
GFR NON-AFRICAN AMERICAN: >60 ML/MIN/1.73
GLUCOSE BLD-MCNC: 128 MG/DL (ref 74–99)
HCT VFR BLD CALC: 44.1 % (ref 37–54)
HEMOGLOBIN: 15.8 G/DL (ref 12.5–16.5)
INR BLD: 1.1
MCH RBC QN AUTO: 34.8 PG (ref 26–35)
MCHC RBC AUTO-ENTMCNC: 35.8 % (ref 32–34.5)
MCV RBC AUTO: 97.1 FL (ref 80–99.9)
PDW BLD-RTO: 12.8 FL (ref 11.5–15)
PLATELET # BLD: 148 E9/L (ref 130–450)
PMV BLD AUTO: 9.3 FL (ref 7–12)
POTASSIUM REFLEX MAGNESIUM: 4.3 MMOL/L (ref 3.5–5)
PROTHROMBIN TIME: 11.8 SEC (ref 9.3–12.4)
RBC # BLD: 4.54 E12/L (ref 3.8–5.8)
SODIUM BLD-SCNC: 138 MMOL/L (ref 132–146)
WBC # BLD: 6.2 E9/L (ref 4.5–11.5)

## 2021-09-21 PROCEDURE — C1760 CLOSURE DEV, VASC: HCPCS

## 2021-09-21 PROCEDURE — C1887 CATHETER, GUIDING: HCPCS

## 2021-09-21 PROCEDURE — 2709999900 HC NON-CHARGEABLE SUPPLY

## 2021-09-21 PROCEDURE — C1894 INTRO/SHEATH, NON-LASER: HCPCS

## 2021-09-21 PROCEDURE — 85610 PROTHROMBIN TIME: CPT

## 2021-09-21 PROCEDURE — C1725 CATH, TRANSLUMIN NON-LASER: HCPCS

## 2021-09-21 PROCEDURE — C1769 GUIDE WIRE: HCPCS

## 2021-09-21 PROCEDURE — 86901 BLOOD TYPING SEROLOGIC RH(D): CPT

## 2021-09-21 PROCEDURE — 85027 COMPLETE CBC AUTOMATED: CPT

## 2021-09-21 PROCEDURE — 86850 RBC ANTIBODY SCREEN: CPT

## 2021-09-21 PROCEDURE — 36415 COLL VENOUS BLD VENIPUNCTURE: CPT

## 2021-09-21 PROCEDURE — 37224 PR REVSC OPN/PRG FEM/POP W/ANGIOPLASTY UNI: CPT | Performed by: SURGERY

## 2021-09-21 PROCEDURE — 37224 HC FEM POP TERRITORY PLASTY: CPT | Performed by: SURGERY

## 2021-09-21 PROCEDURE — 2500000003 HC RX 250 WO HCPCS

## 2021-09-21 PROCEDURE — 86900 BLOOD TYPING SEROLOGIC ABO: CPT

## 2021-09-21 PROCEDURE — 6360000002 HC RX W HCPCS

## 2021-09-21 PROCEDURE — 37220 HC ILIAC TERRITORY PLASTY: CPT | Performed by: SURGERY

## 2021-09-21 PROCEDURE — 37220 PR REVASCULARIZATION ILIAC ARTERY ANGIOP 1ST VSL: CPT | Performed by: SURGERY

## 2021-09-21 PROCEDURE — 80048 BASIC METABOLIC PNL TOTAL CA: CPT

## 2021-09-21 RX ORDER — SODIUM CHLORIDE 0.9 % (FLUSH) 0.9 %
10 SYRINGE (ML) INJECTION EVERY 12 HOURS SCHEDULED
Status: DISCONTINUED | OUTPATIENT
Start: 2021-09-21 | End: 2021-09-21 | Stop reason: HOSPADM

## 2021-09-21 RX ORDER — SODIUM CHLORIDE 0.9 % (FLUSH) 0.9 %
10 SYRINGE (ML) INJECTION PRN
Status: DISCONTINUED | OUTPATIENT
Start: 2021-09-21 | End: 2021-09-21 | Stop reason: HOSPADM

## 2021-09-21 RX ORDER — SODIUM CHLORIDE 9 MG/ML
25 INJECTION, SOLUTION INTRAVENOUS PRN
Status: DISCONTINUED | OUTPATIENT
Start: 2021-09-21 | End: 2021-09-21 | Stop reason: HOSPADM

## 2021-09-21 RX ORDER — SODIUM CHLORIDE 9 MG/ML
INJECTION, SOLUTION INTRAVENOUS CONTINUOUS
Status: DISCONTINUED | OUTPATIENT
Start: 2021-09-21 | End: 2021-09-21 | Stop reason: HOSPADM

## 2021-09-21 ASSESSMENT — PAIN SCALES - GENERAL: PAINLEVEL_OUTOF10: 0

## 2021-09-21 NOTE — PROGRESS NOTES
Extended Stay Recovery Discharge Documentation    Final procedure site check completed, stable for discharge- Yes  Ambulated without issue post recovery completion, gait steady. Peripheral IV sites removed (see IV Flowsheet) and site asymptomatic- Yes  Patient dressed self without assistance. Discharge instructions reviewed with Patient and verbalized understanding.    Patient discharged Home with friend at PM  Monitor cleaned and placed back in nurses' station- Yes

## 2021-09-21 NOTE — OP NOTE
Cardiovascular Lab Procedure Report    France Dan  1965    Date : 9/21/2021  Surgeon: Stoney Hubbard M.D. Pre-procedure Diagnosis: Bilateral LE lifestyle limiting claudication  Post-procedure Diagnosis: Same  Procedure:      Right  common femoral artery access   with US guidance    8 fr angioseal used for closure    Bilateral lower extremity angiogram    TF Left lower extremity angiogram   79399 Left internal iliac artery plasty with 5x20 evercross   [id number] Left common femoral angioplasty with   7 x 40 evercross   Anesthesia: Local with IV sedation  Assistants: Cath Lab Staff  Estimated Blood Loss: Minimal  Complications: none  Findings: Abdominal aorta :patent   Right Left Left s/p Intervention   Common Iliac Art patent patent patent   External Iliac Art patent patent patent   Internal Iliac Art Proximally occluded after intervention distal filing noted > 90% stenosis at orifice, occludes proximally and recons but poor florw Patent, occludes proximally but improved distal recon flow   Common Femoral Art patent > 60% stenosis  patent   Superficial Femoral Art Not visualized Patent but diseased Patent but diseased not flow limiting   Profunda Femoral Art Not visualized patent patent   AK Popliteal Art Not visualized patent patent   BK Popliteal Art Not visualized patent patent   Anterior Tibial Art Not visualized Proximally diseased, distal recon Proximally diseased, distal recon   Tibioperoneal Trunk Not visualized patent patent   Peroneal Art Not visualized Patent, occludes distal calf Patent, occludes distal calf   Posterior Tibial Art Not visualized patent patent     Procedure Details : There was previous CTA , or catheter based diagnostic imaging preformed prior to today's procedure. Timeout preformed identifying pt and procedure. Groins prepped and draped in sterile fashion. Patient given sedation as needed throughout the case.      Right  common femoral artery was noted to be patent and was accessed under ultrasound guidance after infiltrating with local.  A printer was used to print out an image. Micropuncture placed, exchanged out for 5 fr sheath. Advantage glide wire and catheter advanced into distal aorta. Bilateral lower extremity angiogram preformed. catheter and wire used to access Left  iliac system and catheter placed at common femoral and angiogram of theLeft  lower extremity preformed. Significant occlusive disease was noted in the common femoral artery. Patient was given 9000 units of heparin and than a 7 fr destination sheath advanced to common iliac artery on the Left. The left internal iliac artery was accessed. Unable to track past proximal occlusion but ble to plasty the proximal orifice with baloon. The left common femoral lesion was treated with 7x40. There was evidence of some residual stenosis. Completion angiogram noted much improved filling distally and brisk flow though the stenotic area. Sheath and wire pulled back to Right iliac system.   After femoral angiogram a 8 fr angioseal device used to close the Right common femoral artery    Postop Exam  Right DP monophasic  PT weakly biphasic  Left DP biphasic  PT biphasic    Plan  Continue Medical Management with asa, plavix and statin  Encourage continued tobacco cessation  If improvement noted would consider R internal iliac and R sfa intervention    Grover Reagan MD    PCP : Kelsey Holt MD

## 2021-09-21 NOTE — H&P
Vascular Surgery History & Physical Exam    Chief Complaint: Peripheral vascular disease, lifestyle limiting claudication    HISTORY OF PRESENT ILLNESS:    The patient is a 49 y.o. male who presents to the hospital for elective arteriogram with possible intervention. The patient has a history of peripheral vascular disease and lifestyle limiting claudication. ROS : All others Negative if blank [], Positive if [x]  General   [] Fevers   [] Chills   [] Weight Loss   Skin   [] Tissue Loss   Eyes   [x] Wears Glasses/Contacts   [] Vision Changes   Respiratory    [] Shortness of breath   Cardiovascular   [] Chest Pain   [] Shortness of breath with exertion   Gastrointestinal   [] Abdominal Pain     Past Medical History:   Diagnosis Date    Carotid stenosis     [facility]erebral artery occlusion with [facility] [address] [address]     Diabetes mellitus ([facility] [address]     Emphysema     Hyperlipidemia     Hypertension     PVD (peripheral vascular disease) with claudication ([facility] [address] 2/11/2019     Past Surgical History:   Procedure Laterality Date    COLONOSCOPY      DENTAL SURGERY      TONSILLECTOMY      VASECTOMY       Current Medications:     Current Outpatient Medications:     albuterol sulfate HFA (VENTOLIN HFA) 108 (90 Base) MCG/ACT inhaler, Inhale 2 puffs into the lungs every 6 hours as needed for Wheezing, Disp: , Rfl:     SILDENAFIL CITRATE PO, Take by mouth, Disp: , Rfl:     gabapentin (NEURONTIN) 300 MG capsule, daily. , Disp: , Rfl:     vitamin B-12 (CYANOCOBALAMIN) 100 MCG tablet, Take 50 mcg by mouth daily, Disp: , Rfl:     atorvastatin (LIPITOR) 10 MG tablet, Take 1 tablet by mouth nightly, Disp: 30 tablet, Rfl: 0    clopidogrel (PLAVIX) 75 MG tablet, Take 1 tablet by mouth daily, Disp: 30 tablet, Rfl: 0    tamsulosin (FLOMAX) 0.4 MG capsule, Take 0.4 mg by mouth daily , Disp: , Rfl:     lisinopril (PRINIVIL;ZESTRIL) 5 MG tablet, Take 10 mg by mouth daily , Disp: , Rfl:     divalproex (DEPAKOTE) 250 MG DR tablet, Take 250 mg by mouth 2 times daily, Disp: , Rfl:     TOUJEO SOLOSTAR 300 UNIT/ML injection pen, INJECT 45 UNITS SUBCUTANEOUSLY ONCE A DAY, Disp: , Rfl: 2    aspirin 81 MG chewable tablet, Take 1 tablet by mouth daily, Disp: 30 tablet, Rfl: 3    metFORMIN (GLUCOPHAGE) 500 MG tablet, Take 2 tablets by mouth 2 times daily (with meals), Disp: 120 tablet, Rfl: 0    Blood Glucose Monitoring Suppl (ACCU-CHEK DENNIS PLUS) w/Device KIT, USE TO TEST THREE TIMES A DAY, Disp: , Rfl:     Current Facility-Administered Medications:     0.9 % sodium chloride infusion, , IntraVENous, Continuous, FREDI Thomas-JOSETTE    sodium chloride flush 0.9 % injection 10 mL, 10 mL, IntraVENous, 2 times per day, Anupama Rodriguezxsharon PA-JOSETTE    sodium chloride flush 0.9 % injection 10 mL, 10 mL, IntraVENous, PRN, Anupama Rodriguezxsharon PA-C    0.9 % sodium chloride infusion, 25 mL, IntraVENous, PRN, FREDI Thomas-JOSETTE    ceFAZolin (ANCEF) 2000 mg in sterile water 20 mL IV syringe, 2,000 mg, IntraVENous, On Call to OR, Anupama Barrios PA-C  Allergies:  Patient has no known allergies.   Social History     Socioeconomic History    Marital status:      Spouse name: Not on file    Number of children: Not on file    Years of education: Not on file    Highest education level: Not on file   Occupational History    Not on file   Tobacco Use    Smoking status: Current Every Day Smoker     Packs/day: 1.00     Types: Cigarettes    Smokeless tobacco: Current User     Types: Snuff   Vaping Use    Vaping Use: Former    Substances: Always   Substance and Sexual Activity    Alcohol use: Yes     Comment: rare    Drug use: No    Sexual activity: Not on file   Other Topics Concern    Not on file   Social History Narrative    Not on file     Social Determinants of Health     Financial Resource Strain:     Difficulty of Paying Living Expenses:    Food Insecurity:     Worried About Running Out of Food in the Last Year:     [address] [facility] [address] in the Last Year:    Transportation Needs:     Lack of Transportation (Medical):  Lack of Transportation (Non-Medical):    Physical Activity:     Days of Exercise per Week:     Minutes of Exercise per Session:    Stress:     Feeling of Stress :    Social Connections:     Frequency of Communication with Friends and Family:     Frequency of Social Gatherings with Friends and Family:     Attends [employer] Services:     Active Member of Clubs or Organizations:     Attends Club or Organization Meetings:     Marital Status:    Intimate Partner Violence:     Fear of Current or Ex-Partner:     Emotionally Abused:     Physically Abused:     Sexually Abused:      No family history on file. PHYSICAL EXAM:    Vitals:    09/21/21 0630   BP: (!) 168/75   Pulse: 75   Resp: 18   Temp: 96.7 °F (35.9 °C)   SpO2: 97%     CONSTITUTIONAL:  awake, alert, cooperative, no apparent distress, and appears stated age  NECK:  supple, symmetrical, trachea midline  LUNGS:  no increased work of breathing, good resp excursion  CARDIOVASCULAR:  regular rate and rhythm  ABDOMEN:  soft, non-distended and non-tenderl   Pulse Exam   R femoral 2+ L femoral 1+   R dorsalis pedis monophasic L dorsalis pedis biphasic   R posterior tibial Weakly biphasic L posterior tibial biphasic     LABS:    Lab Results   Component Value Date    WBC 6.2 09/21/2021    HGB 15.8 09/21/2021    HCT 44.1 09/21/2021     09/21/2021    PROTIME 11.8 09/21/2021    INR 1.1 09/21/2021    APTT 30.8 01/06/2020    K 4.3 09/21/2021    BUN 12 09/21/2021    CREATININE 0.6 (L) 09/21/2021     Assesment:  Peripheral vascular disease. claudication  PLAN:    · Aortogram,  Left lower extremity arteriogram, possible intervention. · I reviewed the procedure with the patient and family as available. I discussed the procedure, risks, benefits, complications, and alternatives of the procedure. They understand and consent.   All questions were answered    Pratremaine WILSON

## 2021-09-22 ENCOUNTER — TELEPHONE (OUTPATIENT)
Dept: VASCULAR SURGERY | Age: 56
End: 2021-09-22

## 2021-09-23 ENCOUNTER — TELEPHONE (OUTPATIENT)
Dept: VASCULAR SURGERY | Age: 56
End: 2021-09-23

## 2021-10-08 ENCOUNTER — TELEPHONE (OUTPATIENT)
Dept: VASCULAR SURGERY | Age: 56
End: 2021-10-08

## 2021-10-11 ENCOUNTER — OFFICE VISIT (OUTPATIENT)
Dept: VASCULAR SURGERY | Age: 56
End: 2021-10-11
Payer: MEDICARE

## 2021-10-11 VITALS — HEIGHT: 68 IN | WEIGHT: 208 LBS | BODY MASS INDEX: 31.52 KG/M2

## 2021-10-11 DIAGNOSIS — I77.1 STENOSIS OF LEFT SUBCLAVIAN ARTERY (HCC): ICD-10-CM

## 2021-10-11 DIAGNOSIS — I65.02 STENOSIS OF LEFT VERTEBRAL ARTERY: ICD-10-CM

## 2021-10-11 DIAGNOSIS — I65.23 ASYMPTOMATIC BILATERAL CAROTID ARTERY STENOSIS: ICD-10-CM

## 2021-10-11 DIAGNOSIS — I73.9 PVD (PERIPHERAL VASCULAR DISEASE) WITH CLAUDICATION (HCC): Primary | ICD-10-CM

## 2021-10-11 PROCEDURE — 99213 OFFICE O/P EST LOW 20 MIN: CPT | Performed by: SURGERY

## 2021-10-11 NOTE — PROGRESS NOTES
Vascular Surgery Outpatient Followup    PCP : Maximo Canavan, MD   Urologist: Dr. Ewa Olmedo. [place]    9/21/21 Left internal iliac artery plasty  Left common femoral artery plasty         HISTORY OF PRESENT ILLNESS:    This is a 49 y.o. male who presents in fu regarding pvd and carotid disease. He is able to walk about 500 feet (previously 200 feet) before he has to stop. The left leg is no longer bothering him. His right leg does bother. His right leg continues to be similar to before with pain in hip that radiates down his leg laterally down thigh than crossing over his knee and down to medial calf to foot. The right LE is now his limiting factor. Resting makes it better. 8/10 pain. The pain is crampy, aching, sore. Denies LE rest pain or ulceration. He denies any new symptoms of stroke, tia, localized weakness, slurred speech, or amaurosis fugax. 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 MRI was negative for an acute stroke. When he raises his chin up to shave he gets light headed which continues to be an issue. He denies any L UE claudication, rest pain, or open wounds. 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 Patient chronically has erectile dysfunction. He has been dealing with this for many years, and states he has tried Cialis and Viagra without success. He had penile pump placed. 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 Denies significant pain associated with this.    States he does not see a DPM.    Past Medical History:        Diagnosis Date    Carotid stenosis     [facility]erebral [facility] occlusion [facility] infarction [facility] [address]     Diabetes mellitus ([address]     Emphysema     Hyperlipidemia     Hypertension     [facility]peripheral vascular [facility] 2/11/2019     Past Surgical History:        Procedure Laterality Date    COLONOSCOPY      DENTAL SURGERY      TONSILLECTOMY      VASECTOMY       Current Medications:   Current Outpatient Medications   Medication Sig Dispense Refill    albuterol sulfate HFA (VENTOLIN HFA) 108 (90 Base) MCG/ACT inhaler Inhale 2 puffs into the lungs every 6 hours as needed for Wheezing      SILDENAFIL CITRATE PO Take by mouth      gabapentin (NEURONTIN) 300 MG capsule daily.  Blood Glucose Monitoring Suppl (ACCU-CHEK DENNIS PLUS) w/Device KIT USE TO TEST THREE TIMES A DAY      vitamin B-12 (CYANOCOBALAMIN) 100 MCG tablet Take 50 mcg by mouth daily      atorvastatin (LIPITOR) 10 MG tablet Take 1 tablet by mouth nightly 30 tablet 0    clopidogrel (PLAVIX) 75 MG tablet Take 1 tablet by mouth daily 30 tablet 0    tamsulosin (FLOMAX) 0.4 MG capsule Take 0.4 mg by mouth daily       lisinopril (PRINIVIL;ZESTRIL) 5 MG tablet Take 10 mg by mouth daily       divalproex (DEPAKOTE) 250 MG DR tablet Take 250 mg by mouth 2 times daily      TOUJEO SOLOSTAR 300 UNIT/ML injection pen INJECT 45 UNITS SUBCUTANEOUSLY ONCE A DAY  2    aspirin 81 MG chewable tablet Take 1 tablet by mouth daily 30 tablet 3    metFORMIN (GLUCOPHAGE) 500 MG tablet Take 2 tablets by mouth 2 times daily (with meals) 120 tablet 0     No current facility-administered medications for this visit. Allergies:  Patient has no known allergies.   Social History     Socioeconomic History    Marital status:      Spouse name: Not on file    Number of children: Not on file    Years of education: Not on file    Highest education level: Not on file   Occupational History    Not on file   Tobacco Use    Smoking status: Current Every Day Smoker     Packs/day: 1.00     Types: Cigarettes    Smokeless tobacco: Current User     Types: Snuff   Vaping Use    Vaping Use: Former    Substances: Always   Substance and Sexual Activity  Alcohol use: Yes     Comment: rare    Drug use: No    Sexual activity: Not on file   Other Topics Concern    Not on file   Social History Narrative    Not on file     Social Determinants of Health     Financial Resource Strain:     Difficulty of Paying Living Expenses:    Food Insecurity:     Worried About Running Out of Food in the Last Year:     [address] in the Last Year:    Transportation Needs:     Lack of Transportation (Medical):  Lack of Transportation (Non-Medical):    Physical Activity:     Days of Exercise per Week:     Minutes of Exercise per Session:    Stress:     Feeling of Stress :    Social Connections:     Frequency of Communication with Friends and Family:     Frequency of Social Gatherings with Friends and Family:     Attends Judaism Services:     Active Member of Clubs or Organizations:     Attends Club or Organization Meetings:     Marital Status:    Intimate Partner Violence:     Fear of Current or Ex-Partner:     Emotionally Abused:     Physically Abused:     Sexually Abused:      Family hx  Heart disease, mi  Denies family hx of stroke.       Labs  Lab Results   Component Value Date    WBC 6.2 09/21/2021    HGB 15.8 09/21/2021    HCT 44.1 09/21/2021     09/21/2021    PROTIME 11.8 09/21/2021    INR 1.1 09/21/2021    APTT 30.8 01/06/2020    K 4.3 09/21/2021    BUN 12 09/21/2021    CREATININE 0.6 (L) 09/21/2021     PHYSICAL EXAM:    CONSTITUTIONAL:   Awake, alert, cooperative  PSYCHIATRIC :  Oriented to time, place and person     Appropriate insight to disease process  EYES: Lids and lashes normal  ENT:  External ears and nose without lesions   Hearing deficits not noted  CN II _ XII intact, + glasses  NECK: Supple, symmetrical, trachea midline   Carotid bruit on left  LUNGS:  No increased work of breathing                 Clear to auscultation  CARDIOVASCULAR:  regular rate and rhythm   ABDOMEN:  soft, non-distended, non-tender   Aorta is not palpable  SKIN:   Normal skin color   Texture and turgor normal, no induration  EXTREMITIES:   R UE 3+ brachial   2+ radial  L UE 1+ brachial   biphasic radial  R LE No groin hematoma, ttp, ecchymosis   Edema absent   DP and PT weakly biphasic  L LE Edema absent   DP weakly biphasic, PT strongly biphasic    RADIOLOGY:  Carotid us 9/14/20  R ICA <50% stenosis   R Vertebral has antegrade flow  L ICA 50-59% stenosis   L Vertebral has retrograde flow     A/P Asymptomatic Bilateral Carotid Stenosis  · Ultrasound from previous  ? R ICA < 50% stenosis, stable from previous   ?  L ICA 50-59% stenosis, stable from previous imaging   · No indication for intervention at this time as he is assx and less than 80% stenosis  · Unlikely that his symptoms of diplopia were associated with his carotid disease  · Continue medical management with asa  · Emphasized importance of Tobacco cessation  · Discussed with patient pathophysiology of carotid stenosis and all ?s answered  · Discussed with patient symptoms of stroke, TIA and they understood to go to ER immediately if any symptoms developed  · F/u 6 months with carotid ultrasound     L Subclavian Artery stenosis  L vertebral artery retrograde flow  · Pt is likely sx when he raises his chin and gets dizzy  · If issues become more significant he understands to call and let me know  · L subclavian 80% stenosis on previous CTA at takeoff  · Continue current medical management with aspirin  · Discussed with pt tobacco use and relationship to PVD  · Pt understands tobacco use can contribute to worsening of pvd and development of limb loss   · Emphasized importance of call if develops any wounds, ulcerations, changes in appearance, claudication and/or symptoms     Sx PVD of the LE  Erectile dysfunction  L LE common femoral and internal iliac plasty  · L LE is doing much better and no longer has significant sxs  · His walking distance is improved but R LE is limiting factor  · His right lower extremity does note some significant arterial occlusive disease with a significant drop in the JOSE after exercise  · Continue Medical management with asa, plavix, statin  · Discussed with pt tobacco use and relationship to PVD  · pt started smoking again - he understands we will not proceed if he continues to smoke   · Pt understands tobacco use can contribute to worsening of pvd and development of limb loss   · Emphasized importance of foot care and to call if develops any wounds, ulcerations, changes in appearance, claudication and/or symptoms  · Fu in 4-6 weeks with abis and pvrs  · If significant sxs still in R LE and he has stopped smoking will proceed with R LE angiogram    Bilateral hip pain  · Work-up per patient's primary  · Encouraged him to discuss with at next follow-up appointment  · underlying etiology of symptoms could be more orthoapedic related, bony    Jemma Mitchell MD

## 2021-10-11 NOTE — PATIENT INSTRUCTIONS
Patient Education        Learning About Peripheral Arterial Disease (PAD)  What is peripheral arterial disease? Peripheral arterial disease (PAD) is narrowing or blockage of arteries that causes poor blood flow to your arms and legs. PAD is most common in the legs. The most common cause of PAD is the buildup of plaque on the inside of arteries. Over time, plaque builds up in the walls of the arteries, including those that supply blood to your legs. If you have PAD, you're likely to have plaque in other arteries in your body. This raises your risk of a heart attack and stroke. Medicines and lifestyle changes may lower your risk of heart attack and stroke. They may also help if you have symptoms. In some cases, surgery or other treatment is needed. Peripheral arterial disease is also called peripheral vascular disease. What are the symptoms? Many people who have PAD don't have symptoms. If you have symptoms, they may include a tight, aching, or squeezing pain in your calf, thigh, or buttock. This pain is called intermittent claudication. It usually happens after you have walked a certain distance. The pain goes away if you stop walking. As PAD gets worse, you may have pain in your foot or toe when you aren't walking. Other symptoms may include weak or tired legs. You might have trouble walking or balancing. If PAD gets worse, you may have other symptoms caused by poor blood flow to your legs and feet. These symptoms aren't common. They may include cold or numb feet or toes, sores that are slow to heal, or leg or foot pain when you're at rest.  How can you prevent PAD? · Quit smoking. Quitting smoking is one of the best things you can do to help prevent PAD. If you need help quitting, talk to your doctor about stop-smoking programs and medicines. These can increase your chances of quitting for good. · Stay at a healthy weight.   · Manage other health problems, including diabetes, high blood pressure, and high cholesterol. · Be physically active. Try to do moderate activity at least 2½ hours a week. Or try to do vigorous activity at least 1¼ hours a week. You may want to walk or try other activities, such as running, swimming, cycling, or playing tennis or team sports. · Eat a variety of heart-healthy foods. ? Eat fruits, vegetables, whole grains, beans, and other high-fiber foods. ? Eat lean proteins, such as seafood, lean meats, beans, nuts, and soy products. ? Eat healthy fats, such as canola and olive oil. ? Choose foods that are low in saturated fat and avoid trans fat. ? Limit sodium and alcohol. ? Limit drinks and foods with added sugar. How is PAD treated? Treatment for PAD focuses on relieving symptoms and lowering your risk of heart attack and stroke. Making healthy lifestyle changes can help you lower this risk. · If you smoke, quit. Quitting is the best thing you can do when you have PAD. Medicines and counseling can help you quit for good. · Get regular exercise (if your doctor says it's safe). Try walking, swimming, or biking for at least 30 minutes on most, if not all, days of the week. If you have leg symptoms when you exercise, your doctor might recommend a specialized exercise program that may relieve symptoms. The goal is to be able to walk farther without pain. · Eat heart-healthy foods, such as vegetables, fruits, nuts, beans, fish, and whole grains. Limit foods that have a lot of salt, fat, and sugar. · Stay at a healthy weight. Lose weight if you need to. You may need medicines to help lower your risk of heart attack and stroke. These include medicine to prevent blood clots, improve cholesterol, or lower blood pressure. You also may take a medicine that can help ease pain while you are walking. People who have severe PAD may have bypass surgery or other procedures (such as angioplasty) to restore proper blood flow to the legs.   Follow-up care is a key part of your treatment and safety. Be sure to make and go to all appointments, and call your doctor if you are having problems. It's also a good idea to know your test results and keep a list of the medicines you take. Where can you learn more? Go to [email or web address] and sign in to your [employer] account. Enter N755 in the [employer] box to learn more about \"Learning About Peripheral Arterial Disease (PAD). \"     If you do not have an account, please click on the \"Sign Up Now\" link. Current as of: April 29, 2021               Content Version: 13.0  © 3249-0352 Healthwise, Incorporated. Care instructions adapted under license by [facility] ([facility]). If you have questions about a medical condition or this instruction, always ask your healthcare professional. [address] any warranty or liability for your use of this information.

## 2021-10-12 ENCOUNTER — TELEPHONE (OUTPATIENT)
Dept: VASCULAR SURGERY | Age: 56
End: 2021-10-12

## 2021-10-12 NOTE — TELEPHONE ENCOUNTER
Notified patient of doppler study at [facility] on Friday, 11-26-21 at 8:45 am.  [place] at 8:15 am.   Rescheduled appt with Dr. Devon Torres to 11-29-21 at 1:30 pm.

## 2021-11-07 ENCOUNTER — HOSPITAL ENCOUNTER (EMERGENCY)
Age: 56
Discharge: LEFT BEFORE DISCHARGE | End: 2021-11-07
Attending: STUDENT IN AN ORGANIZED HEALTH CARE EDUCATION/TRAINING PROGRAM
Payer: MEDICARE

## 2021-11-07 ENCOUNTER — APPOINTMENT (OUTPATIENT)
Dept: GENERAL RADIOLOGY | Age: 56
End: 2021-11-07
Payer: MEDICARE

## 2021-11-07 VITALS
BODY MASS INDEX: 31.93 KG/M2 | RESPIRATION RATE: 18 BRPM | TEMPERATURE: 97.9 F | WEIGHT: 210 LBS | HEART RATE: 70 BPM | SYSTOLIC BLOOD PRESSURE: 145 MMHG | OXYGEN SATURATION: 98 % | DIASTOLIC BLOOD PRESSURE: 81 MMHG

## 2021-11-07 DIAGNOSIS — L03.115 CELLULITIS OF RIGHT LOWER EXTREMITY: Primary | ICD-10-CM

## 2021-11-07 LAB
ALBUMIN SERPL-MCNC: 4.3 G/DL (ref 3.5–5.2)
ALP BLD-CCNC: 81 U/L (ref 40–129)
ALT SERPL-CCNC: 23 U/L (ref 0–40)
ANION GAP SERPL CALCULATED.3IONS-SCNC: 9 MMOL/L (ref 7–16)
AST SERPL-CCNC: 21 U/L (ref 0–39)
BASOPHILS ABSOLUTE: 0.06 E9/L (ref 0–0.2)
BASOPHILS RELATIVE PERCENT: 0.7 % (ref 0–2)
BILIRUB SERPL-MCNC: 0.4 MG/DL (ref 0–1.2)
BUN BLDV-MCNC: 13 MG/DL (ref 6–20)
CALCIUM SERPL-MCNC: 9.8 MG/DL (ref 8.6–10.2)
CHLORIDE BLD-SCNC: 106 MMOL/L (ref 98–107)
CO2: 26 MMOL/L (ref 22–29)
CREAT SERPL-MCNC: 0.7 MG/DL (ref 0.7–1.2)
EOSINOPHILS ABSOLUTE: 0.29 E9/L (ref 0.05–0.5)
EOSINOPHILS RELATIVE PERCENT: 3.6 % (ref 0–6)
GFR AFRICAN AMERICAN: >60
GFR NON-AFRICAN AMERICAN: >60 ML/MIN/1.73
GLUCOSE BLD-MCNC: 200 MG/DL (ref 74–99)
HCT VFR BLD CALC: 46.9 % (ref 37–54)
HEMOGLOBIN: 16.5 G/DL (ref 12.5–16.5)
IMMATURE GRANULOCYTES #: 0.02 E9/L
IMMATURE GRANULOCYTES %: 0.2 % (ref 0–5)
LYMPHOCYTES ABSOLUTE: 3.23 E9/L (ref 1.5–4)
LYMPHOCYTES RELATIVE PERCENT: 39.9 % (ref 20–42)
MCH RBC QN AUTO: 35 PG (ref 26–35)
MCHC RBC AUTO-ENTMCNC: 35.2 % (ref 32–34.5)
MCV RBC AUTO: 99.6 FL (ref 80–99.9)
MONOCYTES ABSOLUTE: 0.61 E9/L (ref 0.1–0.95)
MONOCYTES RELATIVE PERCENT: 7.5 % (ref 2–12)
NEUTROPHILS ABSOLUTE: 3.89 E9/L (ref 1.8–7.3)
NEUTROPHILS RELATIVE PERCENT: 48.1 % (ref 43–80)
PDW BLD-RTO: 12.7 FL (ref 11.5–15)
PLATELET # BLD: 154 E9/L (ref 130–450)
PMV BLD AUTO: 9.5 FL (ref 7–12)
POTASSIUM REFLEX MAGNESIUM: 4.5 MMOL/L (ref 3.5–5)
RBC # BLD: 4.71 E12/L (ref 3.8–5.8)
SODIUM BLD-SCNC: 141 MMOL/L (ref 132–146)
TOTAL PROTEIN: 7.3 G/DL (ref 6.4–8.3)
WBC # BLD: 8.1 E9/L (ref 4.5–11.5)

## 2021-11-07 PROCEDURE — 85025 COMPLETE CBC W/AUTO DIFF WBC: CPT

## 2021-11-07 PROCEDURE — 6370000000 HC RX 637 (ALT 250 FOR IP): Performed by: STUDENT IN AN ORGANIZED HEALTH CARE EDUCATION/TRAINING PROGRAM

## 2021-11-07 PROCEDURE — 36415 COLL VENOUS BLD VENIPUNCTURE: CPT

## 2021-11-07 PROCEDURE — 90714 TD VACC NO PRESV 7 YRS+ IM: CPT | Performed by: STUDENT IN AN ORGANIZED HEALTH CARE EDUCATION/TRAINING PROGRAM

## 2021-11-07 PROCEDURE — 73590 X-RAY EXAM OF LOWER LEG: CPT

## 2021-11-07 PROCEDURE — 80053 COMPREHEN METABOLIC PANEL: CPT

## 2021-11-07 PROCEDURE — 87040 BLOOD CULTURE FOR BACTERIA: CPT

## 2021-11-07 PROCEDURE — 6360000002 HC RX W HCPCS

## 2021-11-07 PROCEDURE — 96374 THER/PROPH/DIAG INJ IV PUSH: CPT

## 2021-11-07 PROCEDURE — 99284 EMERGENCY DEPT VISIT MOD MDM: CPT

## 2021-11-07 PROCEDURE — 6360000002 HC RX W HCPCS: Performed by: STUDENT IN AN ORGANIZED HEALTH CARE EDUCATION/TRAINING PROGRAM

## 2021-11-07 PROCEDURE — 90471 IMMUNIZATION ADMIN: CPT | Performed by: STUDENT IN AN ORGANIZED HEALTH CARE EDUCATION/TRAINING PROGRAM

## 2021-11-07 PROCEDURE — 2580000003 HC RX 258: Performed by: STUDENT IN AN ORGANIZED HEALTH CARE EDUCATION/TRAINING PROGRAM

## 2021-11-07 RX ORDER — ACETAMINOPHEN 325 MG/1
650 TABLET ORAL ONCE
Status: COMPLETED | OUTPATIENT
Start: 2021-11-07 | End: 2021-11-07

## 2021-11-07 RX ORDER — SULFAMETHOXAZOLE AND TRIMETHOPRIM 800; 160 MG/1; MG/1
1 TABLET ORAL 2 TIMES DAILY
Qty: 20 TABLET | Refills: 0 | Status: SHIPPED | OUTPATIENT
Start: 2021-11-07 | End: 2021-11-17

## 2021-11-07 RX ORDER — TETANUS AND DIPHTHERIA TOXOIDS ADSORBED 2; 2 [LF]/.5ML; [LF]/.5ML
0.5 INJECTION INTRAMUSCULAR ONCE
Status: COMPLETED | OUTPATIENT
Start: 2021-11-07 | End: 2021-11-07

## 2021-11-07 RX ORDER — 0.9 % SODIUM CHLORIDE 0.9 %
1000 INTRAVENOUS SOLUTION INTRAVENOUS ONCE
Status: COMPLETED | OUTPATIENT
Start: 2021-11-07 | End: 2021-11-07

## 2021-11-07 RX ORDER — VANCOMYCIN HYDROCHLORIDE 1 G/20ML
INJECTION, POWDER, LYOPHILIZED, FOR SOLUTION INTRAVENOUS
Status: COMPLETED
Start: 2021-11-07 | End: 2021-11-07

## 2021-11-07 RX ORDER — DOXYCYCLINE HYCLATE 100 MG/1
100 CAPSULE ORAL 2 TIMES DAILY
COMMUNITY
End: 2021-11-07

## 2021-11-07 RX ADMIN — ACETAMINOPHEN 650 MG: 325 TABLET ORAL at 15:55

## 2021-11-07 RX ADMIN — SODIUM CHLORIDE 1000 ML: 9 INJECTION, SOLUTION INTRAVENOUS at 15:55

## 2021-11-07 RX ADMIN — TETANUS AND DIPHTHERIA TOXOIDS ADSORBED 0.5 ML: 2; 2 INJECTION INTRAMUSCULAR at 18:06

## 2021-11-07 RX ADMIN — VANCOMYCIN HYDROCHLORIDE 2000 MG: 1 INJECTION, POWDER, LYOPHILIZED, FOR SOLUTION INTRAVENOUS at 17:30

## 2021-11-07 ASSESSMENT — PAIN SCALES - GENERAL
PAINLEVEL_OUTOF10: 5
PAINLEVEL_OUTOF10: 5
PAINLEVEL_OUTOF10: 0

## 2021-11-07 NOTE — ED PROVIDER NOTES
Department of Emergency Medicine   ED  Provider Note  Admit Date/RoomTime: 11/7/2021  2:48 PM  ED Room: 02/02          History of Present Illness:  11/7/21, Time: 4:31 PM EST  Chief Complaint   Patient presents with   Brendan Bernstein 85     states possibly bit by a spider right lower leg, on abx for this 5 days and seen pcp for this, no improvement, no fever or chills       Milderd Kanner is a 49 y.o. male presenting to the ED for right lower extremity pain. The patient states that approximately 2 weeks ago he thought he had a spider bite. He not seen insect bite him. Originally he had a spot that was itchy. It is progressively worsened. He saw his primary care physician 4 to 5 days ago and was started on doxycycline. He has been taking this twice a day. He feels that since the redness has been spreading. He has right leg throbbing along the rash. Certain movements worsen this. He denies any numbness or tingling. Denies any leg swelling. He has tried Tylenol which minimally has helped. He has not had any drainage. No fevers, nausea or vomiting. No chest pain, shortness of breath or abdominal pain. No history of trauma. Review of Systems:   Constitutional symptoms: Denies fever  Eyes: Denies eye pain  Ears, Nose, Mouth, Throat: Denies ear pain  Cardiovascular: Denies chest pain  Respiratory: Denies shortness of breath  Gastrointestinal: Denies blood per rectum  Genitourinary: Denies hematuria  Skin: Rash of the right leg  Neurological: Denies headache  Musculoskeletal: Positive for right leg pain    --------------------------------------------- PAST HISTORY ---------------------------------------------  Past Medical History:  has a past medical history of Carotid stenosis, Cerebral artery occlusion with cerebral infarction ([facility] [address] [facility] Emphysema, Hyperlipidemia, Hypertension, and PVD (peripheral vascular disease) with claudication ([facility]     Past Surgical History:  has a past surgical history that includes Vasectomy; Tonsillectomy; Colonoscopy; and Dental surgery. Social History:  reports that he has been smoking cigarettes. He has been smoking about 1.00 pack per day. His smokeless tobacco use includes snuff. He reports current alcohol use. He reports that he does not use drugs. Family History: family history is not on file. . Unless otherwise noted, family history is non contributory    The patients home medications have been reviewed. Allergies: Patient has no known allergies. I have reviewed the past medical history, past surgical history, social history, and family history    ---------------------------------------------------PHYSICAL EXAM--------------------------------------    General: The patient is conversational and lying comfortably in bed. Head: Normocephalic and atraumatic. Eyes: Sclera non-icteric and no conjunctival injection  ENT: Nasal and oral membranes appear dry  Neck: Trachea midline. No JVD  Respiratory: Lungs clear to auscultation bilaterally. Patient speaking in full sentences. Cardiovascular: Regular rate. No pedal edema. 2+ DP pulses  Gastrointestinal:  Abdomen is soft and nondistended. Bowel sounds present. There is no tenderness. There is no guarding or rebound. Musculoskeletal: No deformity. Patient able to flex and extend at the hips and knees. Good plantar flexion dorsiflexion of the ankles. Able to wiggle his toes. Minimal tenderness of the tibia anterior on the right lower extremity  Skin: Skin warm and dry. The patient has a region of approximately 3 to 4 inches of erythema that is oval-shaped along the lateral right mid tib-fib. Central ulcerations with 3 locations noted. No vesicles. No fluctuance but warmth and induration present. Neurologic: No gross motor deficits. No aphasia.   Sensation intact to light touch  Psychiatric: Normal affect.    -------------------------------------------------- RESULTS -------------------------------------------------  I have personally reviewed all laboratory and imaging results for this patient. Results are listed below.      LABS: (Lab results interpreted by me)  Results for orders placed or performed during the hospital encounter of 11/07/21   Comprehensive Metabolic Panel w/ Reflex to MG   Result Value Ref Range    Sodium 141 132 - 146 mmol/L    Potassium reflex Magnesium 4.5 3.5 - 5.0 mmol/L    Chloride 106 98 - 107 mmol/L    CO2 26 22 - 29 mmol/L    Anion Gap 9 7 - 16 mmol/L    Glucose 200 (H) 74 - 99 mg/dL    BUN 13 6 - 20 mg/dL    CREATININE 0.7 0.7 - 1.2 mg/dL    GFR Non-African American >60 >=60 mL/min/1.73    GFR African American >60     Calcium 9.8 8.6 - 10.2 mg/dL    Total Protein 7.3 6.4 - 8.3 g/dL    Albumin 4.3 3.5 - 5.2 g/dL    Total Bilirubin 0.4 0.0 - 1.2 mg/dL    Alkaline Phosphatase 81 40 - 129 U/L    ALT 23 0 - 40 U/L    AST 21 0 - 39 U/L   CBC Auto Differential   Result Value Ref Range    WBC 8.1 4.5 - 11.5 E9/L    RBC 4.71 3.80 - 5.80 E12/L    Hemoglobin 16.5 12.5 - 16.5 g/dL    Hematocrit 46.9 37.0 - 54.0 %    MCV 99.6 80.0 - 99.9 fL    MCH 35.0 26.0 - 35.0 pg    MCHC 35.2 (H) 32.0 - 34.5 %    RDW 12.7 11.5 - 15.0 fL    Platelets 299 856 - 796 E9/L    MPV 9.5 7.0 - 12.0 fL    Neutrophils % 48.1 43.0 - 80.0 %    Immature Granulocytes % 0.2 0.0 - 5.0 %    Lymphocytes % 39.9 20.0 - 42.0 %    Monocytes % 7.5 2.0 - 12.0 %    Eosinophils % 3.6 0.0 - 6.0 %    Basophils % 0.7 0.0 - 2.0 %    Neutrophils Absolute 3.89 1.80 - 7.30 E9/L    Immature Granulocytes # 0.02 E9/L    Lymphocytes Absolute 3.23 1.50 - 4.00 E9/L    Monocytes Absolute 0.61 0.10 - 0.95 E9/L    Eosinophils Absolute 0.29 0.05 - 0.50 E9/L    Basophils Absolute 0.06 0.00 - 0.20 E9/L   ,     RADIOLOGY:  Interpreted by Radiologist unless otherwise specified  XR TIBIA FIBULA RIGHT (2 VIEWS)   Final Result   No acute osseous abnormality.             ------------------------- NURSING NOTES AND VITALS REVIEWED ---------------------------   The nursing notes within the ED encounter and vital signs as below have been reviewed by myself  BP (!) 145/81   Pulse 70   Temp 97.9 °F (36.6 °C) (Oral)   Resp 18   Wt 210 lb (95.3 kg)   SpO2 98%   BMI 31.93 kg/m²     Oxygen Saturation Interpretation: Normal    The patients available past medical records and past encounters were reviewed. ------------------------------ ED COURSE/MEDICAL DECISION MAKING----------------------  Medications   0.9 % sodium chloride bolus (0 mLs IntraVENous Stopped 11/7/21 1808)   acetaminophen (TYLENOL) tablet 650 mg (650 mg Oral Given 11/7/21 1555)   vancomycin (VANCOCIN) 1 g injection (2,000 mg  Given 11/7/21 1730)   diptheria-tetanus toxoids (DECAVAC) 2-2 LF/0.5ML injection 0.5 mL (0.5 mLs IntraMUSCular Given 11/7/21 1806)       Medical Decision Making: This is a 49 y.o. male presenting to the ED for right lower extremity pain. On initial presentation, the patient's vital signs are interpreted as hypertensive, afebrile saturating well on room air. Based on history and physical exam, we have a broad differential.  The patient has evidence of ulcerative lesion with surrounding cellulitis. The patient does have some bony tenderness and we cannot exclude deeper involvement. The patient is distally neurovascularly intact. He denies trauma. At this time we will obtain x-ray of the area and basic laboratory studies. The patient will be given Tylenol for pain control and hydrated with a liter bolus. Laboratory studies show electrolytes within normal limits other than hyperglycemia. The patient does have known history of diabetes. No anion gap or acidosis. LFTs within normal limits. No leukocytosis or anemia. Tib-fib x-ray shows no acute osseous abnormality. This is interpreted by radiology. On reevaluation, the patient has no new complaints.   As he is failing a course of outpatient antibiotics, I do feel he requires admission and IV antibiotics. Ulcer to him on vancomycin. His tetanus is updated. I did discuss this with him, the patient states that he is unable to stay in the hospital.  He plans to leave [address] He has somewhere to be tomorrow. He will follow with his primary care physician. We will start him on a course of Bactrim as he is having worsening with doxycycline. This will be discontinued. We have given strict return precautions. This patient has chosen to leave against medical advice. I, Dr. Alyssa Castro, the Emergency Physician has personally explained to them that choosing to do so may result in permanent bodily harm, disability, disfigurement, or death. I discussed at length that without further evaluation and monitoring there may be unforeseen circumstances and deterioration causing permanent bodily harm or death as a result of their choice. They are alert, oriented, and have the capacity and are competent at this time to make this decision. They state that they are aware of the serious risks as explained, but they continue to wish to leave against medical advice. I urged the patient to return to the hospital as soon as possible to complete their evaluation and treatment. This patient's ED course included: a personal history and physicial examination and re-evaluation prior to disposition    This patient has remained hemodynamically stable during their ED course. Consultations:  None    Oxygen Saturation Interpretation: 98 % on room air. Normal    Counseling:   I have spoken with the patient and discussed todays results, in addition to providing specific details for the plan of care and counseling regarding the diagnosis and prognosis. Questions are answered at this time and they are agreeable with the plan.     --------------------------------- IMPRESSION AND DISPOSITION ---------------------------------    IMPRESSION  1.  Cellulitis of right lower extremity DISPOSITION  Disposition: Left AMA  Patient condition is fair    I, Dr. Randi Rodriguez, am the primary provider of record    NOTE: This report was transcribed using voice recognition software.  Every effort was made to ensure accuracy; however, inadvertent computerized transcription errors may be present        Randi Rodriguez MD  11/08/21 0679

## 2021-11-07 NOTE — ED NOTES
2 sets of Blood cultures obtained. 1st set obtained form left AC at 1725, 2nd set obtained from left hand at 1745.

## 2021-11-08 NOTE — ED NOTES
Pt given tetanus vaccine with no complications and pt given discharge instructions as well as med prescription, and pt aware to call primary care physician to schedule follow up appointment     Jacob Dobbs RN  11/07/21 1958

## 2021-11-13 LAB
BLOOD CULTURE, ROUTINE: NORMAL
CULTURE, BLOOD 2: NORMAL

## 2021-11-24 ENCOUNTER — TELEPHONE (OUTPATIENT)
Dept: VASCULAR SURGERY | Age: 56
End: 2021-11-24

## 2022-01-01 PROCEDURE — 96375 TX/PRO/DX INJ NEW DRUG ADDON: CPT

## 2022-01-01 PROCEDURE — 99283 EMERGENCY DEPT VISIT LOW MDM: CPT

## 2022-01-01 PROCEDURE — 96365 THER/PROPH/DIAG IV INF INIT: CPT

## 2022-01-02 ENCOUNTER — HOSPITAL ENCOUNTER (INPATIENT)
Age: 57
LOS: 1 days | Discharge: HOME | DRG: 195 | End: 2022-01-03
Attending: EMERGENCY MEDICINE | Admitting: INTERNAL MEDICINE
Payer: MEDICARE

## 2022-01-02 ENCOUNTER — APPOINTMENT (OUTPATIENT)
Dept: GENERAL RADIOLOGY | Age: 57
DRG: 195 | End: 2022-01-02
Payer: MEDICARE

## 2022-01-02 DIAGNOSIS — J18.9 PNEUMONIA DUE TO INFECTIOUS ORGANISM, UNSPECIFIED LATERALITY, UNSPECIFIED PART OF LUNG: Primary | ICD-10-CM

## 2022-01-02 DIAGNOSIS — R06.2 WHEEZING: ICD-10-CM

## 2022-01-02 PROBLEM — R09.02 HYPOXIA: Status: ACTIVE | Noted: 2022-01-02

## 2022-01-02 LAB
ADENOVIRUS BY PCR: NOT DETECTED
ANION GAP SERPL CALCULATED.3IONS-SCNC: 14 MMOL/L (ref 7–16)
APTT: 28.5 SEC (ref 24.5–35.1)
BASOPHILS ABSOLUTE: 0.08 E9/L (ref 0–0.2)
BASOPHILS RELATIVE PERCENT: 1 % (ref 0–2)
BORDETELLA PARAPERTUSSIS BY PCR: NOT DETECTED
BORDETELLA PERTUSSIS BY PCR: NOT DETECTED
BUN BLDV-MCNC: 14 MG/DL (ref 6–20)
C-REACTIVE PROTEIN: 0.3 MG/DL (ref 0–0.4)
CALCIUM SERPL-MCNC: 9.7 MG/DL (ref 8.6–10.2)
CHLAMYDOPHILIA PNEUMONIAE BY PCR: NOT DETECTED
CHLORIDE BLD-SCNC: 101 MMOL/L (ref 98–107)
CHOLESTEROL, TOTAL: 97 MG/DL (ref 0–199)
CHP ED QC CHECK: YES
CO2: 24 MMOL/L (ref 22–29)
CORONAVIRUS 229E BY PCR: NOT DETECTED
CORONAVIRUS HKU1 BY PCR: NOT DETECTED
CORONAVIRUS NL63 BY PCR: NOT DETECTED
CORONAVIRUS OC43 BY PCR: NOT DETECTED
CREAT SERPL-MCNC: 0.8 MG/DL (ref 0.7–1.2)
D DIMER: <200 NG/ML DDU
EKG ATRIAL RATE: 92 BPM
EKG P AXIS: 71 DEGREES
EKG P-R INTERVAL: 202 MS
EKG Q-T INTERVAL: 402 MS
EKG QRS DURATION: 100 MS
EKG QTC CALCULATION (BAZETT): 497 MS
EKG R AXIS: 121 DEGREES
EKG T AXIS: 68 DEGREES
EKG VENTRICULAR RATE: 92 BPM
EOSINOPHILS ABSOLUTE: 0.36 E9/L (ref 0.05–0.5)
EOSINOPHILS RELATIVE PERCENT: 4.4 % (ref 0–6)
GFR AFRICAN AMERICAN: >60
GFR NON-AFRICAN AMERICAN: >60 ML/MIN/1.73
GLUCOSE BLD-MCNC: 317 MG/DL (ref 74–99)
GLUCOSE BLD-MCNC: 404 MG/DL
HCT VFR BLD CALC: 52.7 % (ref 37–54)
HDLC SERPL-MCNC: 38 MG/DL
HEMOGLOBIN: 18.4 G/DL (ref 12.5–16.5)
HUMAN METAPNEUMOVIRUS BY PCR: NOT DETECTED
HUMAN RHINOVIRUS/ENTEROVIRUS BY PCR: NOT DETECTED
IMMATURE GRANULOCYTES #: 0.01 E9/L
IMMATURE GRANULOCYTES %: 0.1 % (ref 0–5)
INFLUENZA A BY PCR: NOT DETECTED
INFLUENZA B BY PCR: NOT DETECTED
INR BLD: 1.1
LACTIC ACID: 3.1 MMOL/L (ref 0.5–2.2)
LDL CHOLESTEROL CALCULATED: 41 MG/DL (ref 0–99)
LYMPHOCYTES ABSOLUTE: 4.19 E9/L (ref 1.5–4)
LYMPHOCYTES RELATIVE PERCENT: 51 % (ref 20–42)
MCH RBC QN AUTO: 34.1 PG (ref 26–35)
MCHC RBC AUTO-ENTMCNC: 34.9 % (ref 32–34.5)
MCV RBC AUTO: 97.8 FL (ref 80–99.9)
METER GLUCOSE: 253 MG/DL (ref 74–99)
METER GLUCOSE: 404 MG/DL (ref 74–99)
MONOCYTES ABSOLUTE: 0.64 E9/L (ref 0.1–0.95)
MONOCYTES RELATIVE PERCENT: 7.8 % (ref 2–12)
MYCOPLASMA PNEUMONIAE BY PCR: NOT DETECTED
NEUTROPHILS ABSOLUTE: 2.93 E9/L (ref 1.8–7.3)
NEUTROPHILS RELATIVE PERCENT: 35.7 % (ref 43–80)
PARAINFLUENZA VIRUS 1 BY PCR: NOT DETECTED
PARAINFLUENZA VIRUS 2 BY PCR: NOT DETECTED
PARAINFLUENZA VIRUS 3 BY PCR: NOT DETECTED
PARAINFLUENZA VIRUS 4 BY PCR: NOT DETECTED
PDW BLD-RTO: 12.3 FL (ref 11.5–15)
PLATELET # BLD: 154 E9/L (ref 130–450)
PMV BLD AUTO: 9.8 FL (ref 7–12)
POTASSIUM SERPL-SCNC: 4.3 MMOL/L (ref 3.5–5)
PROCALCITONIN: 0.04 NG/ML (ref 0–0.08)
PROCALCITONIN: 0.06 NG/ML (ref 0–0.08)
PROTHROMBIN TIME: 11.7 SEC (ref 9.3–12.4)
RBC # BLD: 5.39 E12/L (ref 3.8–5.8)
RESPIRATORY SYNCYTIAL VIRUS BY PCR: NOT DETECTED
SARS-COV-2, NAAT: NOT DETECTED
SARS-COV-2, PCR: NOT DETECTED
SEDIMENTATION RATE, ERYTHROCYTE: 2 MM/HR (ref 0–15)
SODIUM BLD-SCNC: 139 MMOL/L (ref 132–146)
TRIGL SERPL-MCNC: 89 MG/DL (ref 0–149)
VLDLC SERPL CALC-MCNC: 18 MG/DL
WBC # BLD: 8.2 E9/L (ref 4.5–11.5)

## 2022-01-02 PROCEDURE — 87635 SARS-COV-2 COVID-19 AMP PRB: CPT

## 2022-01-02 PROCEDURE — 96372 THER/PROPH/DIAG INJ SC/IM: CPT

## 2022-01-02 PROCEDURE — 85378 FIBRIN DEGRADE SEMIQUANT: CPT

## 2022-01-02 PROCEDURE — 2500000003 HC RX 250 WO HCPCS: Performed by: EMERGENCY MEDICINE

## 2022-01-02 PROCEDURE — 0202U NFCT DS 22 TRGT SARS-COV-2: CPT

## 2022-01-02 PROCEDURE — 85730 THROMBOPLASTIN TIME PARTIAL: CPT

## 2022-01-02 PROCEDURE — 86140 C-REACTIVE PROTEIN: CPT

## 2022-01-02 PROCEDURE — 85651 RBC SED RATE NONAUTOMATED: CPT

## 2022-01-02 PROCEDURE — 6360000002 HC RX W HCPCS: Performed by: EMERGENCY MEDICINE

## 2022-01-02 PROCEDURE — 85025 COMPLETE CBC W/AUTO DIFF WBC: CPT

## 2022-01-02 PROCEDURE — 80061 LIPID PANEL: CPT

## 2022-01-02 PROCEDURE — 6370000000 HC RX 637 (ALT 250 FOR IP): Performed by: PHYSICIAN ASSISTANT

## 2022-01-02 PROCEDURE — 93005 ELECTROCARDIOGRAM TRACING: CPT | Performed by: EMERGENCY MEDICINE

## 2022-01-02 PROCEDURE — 6370000000 HC RX 637 (ALT 250 FOR IP): Performed by: EMERGENCY MEDICINE

## 2022-01-02 PROCEDURE — 96375 TX/PRO/DX INJ NEW DRUG ADDON: CPT

## 2022-01-02 PROCEDURE — 71046 X-RAY EXAM CHEST 2 VIEWS: CPT

## 2022-01-02 PROCEDURE — 82962 GLUCOSE BLOOD TEST: CPT

## 2022-01-02 PROCEDURE — 83605 ASSAY OF LACTIC ACID: CPT

## 2022-01-02 PROCEDURE — 96365 THER/PROPH/DIAG IV INF INIT: CPT

## 2022-01-02 PROCEDURE — 2580000003 HC RX 258: Performed by: PHYSICIAN ASSISTANT

## 2022-01-02 PROCEDURE — 84145 PROCALCITONIN (PCT): CPT

## 2022-01-02 PROCEDURE — 80048 BASIC METABOLIC PNL TOTAL CA: CPT

## 2022-01-02 PROCEDURE — 85610 PROTHROMBIN TIME: CPT

## 2022-01-02 PROCEDURE — 94664 DEMO&/EVAL PT USE INHALER: CPT

## 2022-01-02 PROCEDURE — 1200000000 HC SEMI PRIVATE

## 2022-01-02 PROCEDURE — 2580000003 HC RX 258: Performed by: EMERGENCY MEDICINE

## 2022-01-02 PROCEDURE — 6360000002 HC RX W HCPCS: Performed by: PHYSICIAN ASSISTANT

## 2022-01-02 RX ORDER — ACETAMINOPHEN 650 MG/1
650 SUPPOSITORY RECTAL EVERY 6 HOURS PRN
Status: DISCONTINUED | OUTPATIENT
Start: 2022-01-02 | End: 2022-01-03 | Stop reason: HOSPADM

## 2022-01-02 RX ORDER — DEXTROSE MONOHYDRATE 25 G/50ML
12.5 INJECTION, SOLUTION INTRAVENOUS PRN
Status: DISCONTINUED | OUTPATIENT
Start: 2022-01-02 | End: 2022-01-03 | Stop reason: HOSPADM

## 2022-01-02 RX ORDER — IPRATROPIUM BROMIDE AND ALBUTEROL SULFATE 2.5; .5 MG/3ML; MG/3ML
1 SOLUTION RESPIRATORY (INHALATION) EVERY 4 HOURS PRN
Status: DISCONTINUED | OUTPATIENT
Start: 2022-01-02 | End: 2022-01-02 | Stop reason: CLARIF

## 2022-01-02 RX ORDER — METOPROLOL SUCCINATE 25 MG/1
25 TABLET, EXTENDED RELEASE ORAL DAILY
COMMUNITY

## 2022-01-02 RX ORDER — NICOTINE POLACRILEX 4 MG
15 LOZENGE BUCCAL PRN
Status: DISCONTINUED | OUTPATIENT
Start: 2022-01-02 | End: 2022-01-03 | Stop reason: HOSPADM

## 2022-01-02 RX ORDER — INSULIN GLARGINE 100 [IU]/ML
36 INJECTION, SOLUTION SUBCUTANEOUS NIGHTLY
Status: DISCONTINUED | OUTPATIENT
Start: 2022-01-02 | End: 2022-01-03 | Stop reason: HOSPADM

## 2022-01-02 RX ORDER — METHYLPREDNISOLONE SODIUM SUCCINATE 125 MG/2ML
125 INJECTION, POWDER, LYOPHILIZED, FOR SOLUTION INTRAMUSCULAR; INTRAVENOUS ONCE
Status: COMPLETED | OUTPATIENT
Start: 2022-01-02 | End: 2022-01-02

## 2022-01-02 RX ORDER — DOXYCYCLINE HYCLATE 100 MG
100 TABLET ORAL 2 TIMES DAILY
Qty: 14 TABLET | Refills: 0 | Status: SHIPPED | OUTPATIENT
Start: 2022-01-02 | End: 2022-01-09

## 2022-01-02 RX ORDER — DIVALPROEX SODIUM 250 MG/1
250 TABLET, DELAYED RELEASE ORAL 2 TIMES DAILY
Status: DISCONTINUED | OUTPATIENT
Start: 2022-01-02 | End: 2022-01-03 | Stop reason: HOSPADM

## 2022-01-02 RX ORDER — CLOPIDOGREL BISULFATE 75 MG/1
75 TABLET ORAL DAILY
Status: DISCONTINUED | OUTPATIENT
Start: 2022-01-02 | End: 2022-01-03 | Stop reason: HOSPADM

## 2022-01-02 RX ORDER — ACETAMINOPHEN 325 MG/1
650 TABLET ORAL EVERY 6 HOURS PRN
Status: DISCONTINUED | OUTPATIENT
Start: 2022-01-02 | End: 2022-01-03 | Stop reason: HOSPADM

## 2022-01-02 RX ORDER — DOXYCYCLINE HYCLATE 100 MG/1
100 CAPSULE ORAL EVERY 12 HOURS SCHEDULED
Status: DISCONTINUED | OUTPATIENT
Start: 2022-01-02 | End: 2022-01-03 | Stop reason: HOSPADM

## 2022-01-02 RX ORDER — ATORVASTATIN CALCIUM 10 MG/1
10 TABLET, FILM COATED ORAL NIGHTLY
Status: DISCONTINUED | OUTPATIENT
Start: 2022-01-02 | End: 2022-01-03 | Stop reason: HOSPADM

## 2022-01-02 RX ORDER — DEXTROSE MONOHYDRATE 50 MG/ML
100 INJECTION, SOLUTION INTRAVENOUS PRN
Status: DISCONTINUED | OUTPATIENT
Start: 2022-01-02 | End: 2022-01-03 | Stop reason: HOSPADM

## 2022-01-02 RX ORDER — BENZONATATE 100 MG/1
100 CAPSULE ORAL 3 TIMES DAILY PRN
Qty: 21 CAPSULE | Refills: 0 | Status: SHIPPED | OUTPATIENT
Start: 2022-01-02 | End: 2022-01-09

## 2022-01-02 RX ORDER — GABAPENTIN 300 MG/1
300 CAPSULE ORAL NIGHTLY
Status: DISCONTINUED | OUTPATIENT
Start: 2022-01-02 | End: 2022-01-03 | Stop reason: HOSPADM

## 2022-01-02 RX ORDER — SODIUM CHLORIDE 0.9 % (FLUSH) 0.9 %
5-40 SYRINGE (ML) INJECTION PRN
Status: DISCONTINUED | OUTPATIENT
Start: 2022-01-02 | End: 2022-01-03 | Stop reason: HOSPADM

## 2022-01-02 RX ORDER — ONDANSETRON 2 MG/ML
4 INJECTION INTRAMUSCULAR; INTRAVENOUS EVERY 6 HOURS PRN
Status: DISCONTINUED | OUTPATIENT
Start: 2022-01-02 | End: 2022-01-03 | Stop reason: HOSPADM

## 2022-01-02 RX ORDER — SODIUM CHLORIDE 0.9 % (FLUSH) 0.9 %
5-40 SYRINGE (ML) INJECTION EVERY 12 HOURS SCHEDULED
Status: DISCONTINUED | OUTPATIENT
Start: 2022-01-02 | End: 2022-01-03 | Stop reason: HOSPADM

## 2022-01-02 RX ORDER — IPRATROPIUM BROMIDE AND ALBUTEROL SULFATE 2.5; .5 MG/3ML; MG/3ML
1 SOLUTION RESPIRATORY (INHALATION) ONCE
Status: COMPLETED | OUTPATIENT
Start: 2022-01-02 | End: 2022-01-02

## 2022-01-02 RX ORDER — CEFTRIAXONE 1 G/1
INJECTION, POWDER, FOR SOLUTION INTRAMUSCULAR; INTRAVENOUS
Status: DISPENSED
Start: 2022-01-02 | End: 2022-01-02

## 2022-01-02 RX ORDER — LISINOPRIL 10 MG/1
10 TABLET ORAL DAILY
Status: DISCONTINUED | OUTPATIENT
Start: 2022-01-02 | End: 2022-01-03 | Stop reason: HOSPADM

## 2022-01-02 RX ORDER — POLYETHYLENE GLYCOL 3350 17 G/17G
17 POWDER, FOR SOLUTION ORAL DAILY PRN
Status: DISCONTINUED | OUTPATIENT
Start: 2022-01-02 | End: 2022-01-03 | Stop reason: HOSPADM

## 2022-01-02 RX ORDER — CEFDINIR 300 MG/1
300 CAPSULE ORAL 2 TIMES DAILY
Qty: 14 CAPSULE | Refills: 0 | Status: SHIPPED | OUTPATIENT
Start: 2022-01-02 | End: 2022-01-09

## 2022-01-02 RX ORDER — TAMSULOSIN HYDROCHLORIDE 0.4 MG/1
0.4 CAPSULE ORAL DAILY
Status: DISCONTINUED | OUTPATIENT
Start: 2022-01-02 | End: 2022-01-03 | Stop reason: HOSPADM

## 2022-01-02 RX ORDER — SODIUM CHLORIDE 9 MG/ML
25 INJECTION, SOLUTION INTRAVENOUS PRN
Status: DISCONTINUED | OUTPATIENT
Start: 2022-01-02 | End: 2022-01-03 | Stop reason: HOSPADM

## 2022-01-02 RX ORDER — ASPIRIN 81 MG/1
81 TABLET, CHEWABLE ORAL DAILY
Status: DISCONTINUED | OUTPATIENT
Start: 2022-01-02 | End: 2022-01-03 | Stop reason: HOSPADM

## 2022-01-02 RX ORDER — ONDANSETRON 4 MG/1
4 TABLET, ORALLY DISINTEGRATING ORAL EVERY 8 HOURS PRN
Status: DISCONTINUED | OUTPATIENT
Start: 2022-01-02 | End: 2022-01-03 | Stop reason: HOSPADM

## 2022-01-02 RX ADMIN — LISINOPRIL 10 MG: 10 TABLET ORAL at 11:04

## 2022-01-02 RX ADMIN — METFORMIN HYDROCHLORIDE 1000 MG: 1000 TABLET ORAL at 15:46

## 2022-01-02 RX ADMIN — METHYLPREDNISOLONE SODIUM SUCCINATE 125 MG: 125 INJECTION, POWDER, FOR SOLUTION INTRAMUSCULAR; INTRAVENOUS at 05:02

## 2022-01-02 RX ADMIN — IPRATROPIUM BROMIDE AND ALBUTEROL SULFATE 1 AMPULE: 2.5; .5 SOLUTION RESPIRATORY (INHALATION) at 04:46

## 2022-01-02 RX ADMIN — INSULIN HUMAN 5 UNITS: 100 INJECTION, SOLUTION PARENTERAL at 11:03

## 2022-01-02 RX ADMIN — DOXYCYCLINE HYCLATE 100 MG: 100 CAPSULE ORAL at 22:16

## 2022-01-02 RX ADMIN — INSULIN GLARGINE 36 UNITS: 100 INJECTION, SOLUTION SUBCUTANEOUS at 22:25

## 2022-01-02 RX ADMIN — ENOXAPARIN SODIUM 40 MG: 100 INJECTION SUBCUTANEOUS at 11:10

## 2022-01-02 RX ADMIN — INSULIN LISPRO 2 UNITS: 100 INJECTION, SOLUTION INTRAVENOUS; SUBCUTANEOUS at 22:24

## 2022-01-02 RX ADMIN — DIVALPROEX SODIUM 250 MG: 250 TABLET, DELAYED RELEASE ORAL at 15:45

## 2022-01-02 RX ADMIN — INSULIN LISPRO 6 UNITS: 100 INJECTION, SOLUTION INTRAVENOUS; SUBCUTANEOUS at 11:03

## 2022-01-02 RX ADMIN — CLOPIDOGREL BISULFATE 75 MG: 75 TABLET, FILM COATED ORAL at 11:04

## 2022-01-02 RX ADMIN — DOXYCYCLINE 100 MG: 100 INJECTION, POWDER, LYOPHILIZED, FOR SOLUTION INTRAVENOUS at 05:04

## 2022-01-02 RX ADMIN — GABAPENTIN 300 MG: 300 CAPSULE ORAL at 22:16

## 2022-01-02 RX ADMIN — WATER 1000 MG: 1 INJECTION INTRAMUSCULAR; INTRAVENOUS; SUBCUTANEOUS at 05:03

## 2022-01-02 RX ADMIN — ATORVASTATIN CALCIUM 10 MG: 10 TABLET, FILM COATED ORAL at 22:17

## 2022-01-02 RX ADMIN — ASPIRIN 81 MG CHEWABLE TABLET 81 MG: 81 TABLET CHEWABLE at 11:04

## 2022-01-02 RX ADMIN — DIVALPROEX SODIUM 250 MG: 250 TABLET, DELAYED RELEASE ORAL at 22:16

## 2022-01-02 RX ADMIN — SODIUM CHLORIDE, PRESERVATIVE FREE 10 ML: 5 INJECTION INTRAVENOUS at 22:17

## 2022-01-02 NOTE — ED PROVIDER NOTES
Chief Complaint   Patient presents with    Shortness of Breath     87% when walking, placed on 2L NC 94%, recently at PCP placed on abx    Cough     producing yellow phlegm       HPI  Vanna Carrera is a 49 y.o. male  who presents with several days of productive cough and worsening shortness of breath. The complaints are persistent, moderate in severity, worsened by Activity and improved by nothing. The patient states that he began feeling poorly approximately 1 week ago. He states that he has already been seen by his PCP who started him on antibiotics. However, he notes that he is continue to worsen over the last few days and felt that he needed to be evaluated again. The patient states that he received the initial 2 doses of the Covid vaccination, but he has not gotten a booster. He denies any known exposures and states that he has not traveled anywhere. The patient denies recent trauma, fever, chills, HA, dizziness, lightheadedness, paresthesias, generalized weakness, confusion, forgetfulness, vision changes, eye redness, congestion, rhinorrhea, sore throat, neck pain, chest pain, palpitations, LE edema, hemoptysis, wheezing, abdominal pain, nausea, vomiting, diarrhea, constipation, hematochezia, melena, dysuria, hematuria, flank pain, decreased UOP, myalgias, arthralgias, ROM issues, swelling, rashes and erythema. ROS is otherwise negative. The patient is currently taking no blood thinners. Tobacco Hx:   reports that he has been smoking cigarettes. He has been smoking about 1.00 pack per day. His smokeless tobacco use includes snuff. Alcohol Hx:   reports current alcohol use. Illicit Drug Hx:   reports no history of drug use. The history is provided by the patient.     Last Tetanus (if applicable): n/a    Review of Systems:   A complete review of systems was performed and pertinent positives and negatives are stated within the HPI, all other systems reviewed and are negative.     Physical Exam:  GEN: Cooperative, well-developed, well-nourished adult in NAD; pt appears stated age  Head: Normocephalic and atraumatic; no tenderness to palpation anywhere  Eyes: PERRL, EOMI, conjunctiva clear, cornea without gross abnormality, no visual deficits noted b/l  Ears: External ear normal-appearing and non-tender b/l; no hearing deficit noted  Nose: Nares patent and free of debris; septum midline; mucosa appears normal; no drainage noted  Throat: Oral mucosa moist and pink with no lesions noted; dentition wnl; no posterior pharyngeal erythema or edema; tonsils are not swollen and there is no exudate noted; no post-nasal drip  Neck: Supple and symmetrical with midline trachea; no cervical or supraclavicular lymphadenopathy noted; thyroid not palpated, but no tenderness or masses noted in the region; normal ROM with no meningeal signs  Lungs: Left basilar crackles; lungs otherwise CTAB with no wheezes, rhonchi or rales noted; no respiratory distress, breathing unlabored   CV: RRR, no murmurs, gallops or rubs noted on auscultation, normal S1/S2; UE and LE distal pulses intact b/l +2/4 with no cyanosis noted; no LE edema noted b/l; capillary refill < 2 seconds  ABD: Soft, non-tender, non-distended, no organomegaly, hernias or masses noted  /Rectal: no suprapubic tenderness; remainder of exam deferred  MSK: UEs and LEs without notable trauma, ROM restriction or tenderness to palpation b/l; normal strength throughout  Skin: Skin warm and dry without notable rash, erythema, bruising or lesion; normal turgor  Neuro: A&O x3; CN II-XII appear grossly intact; no focal deficits appreciated; pt thoughtful in discussion and able to answer all questions without issue  Psych: normal attention with no obvious AVH, normal mood, normal affect  --------------------------------------------- PAST HISTORY ---------------------------------------------  Past Medical History:  has a past medical history of Carotid stenosis, Cerebral artery occlusion with cerebral infarction ([facility] [facility] mellitus [facility] Emphysema, Hyperlipidemia, Hypertension, and PVD (peripheral vascular disease) with claudication ([facility] Past Surgical History:  has a past surgical history that includes Vasectomy; Tonsillectomy; Colonoscopy; and Dental surgery. Social History:  reports that he has been smoking cigarettes. He has been smoking about 1.00 pack per day. His smokeless tobacco use includes snuff. He reports current alcohol use. He reports that he does not use drugs. Family History: family history is not on file. Home Meds: Not in a hospital admission. The patients home medications have been reviewed. Allergies: Patient has no known allergies. ------------------------- NURSING NOTES AND VITALS REVIEWED ---------------------------  Date / Time Roomed:  1/2/2022  4:30 AM  ED Bed Assignment:  21/21    The nursing notes within the ED encounter and vital signs as below have been reviewed. BP (!) 147/92   Pulse 77   Temp 96.8 °F (36 °C)   Resp 18   SpO2 91%   -------------------------------------------------- RESULTS / INTERVENTIONS -------------------------------------------------  All laboratory and radiology tests have been reviewed by this physician.     LABS:  Results for orders placed or performed during the hospital encounter of 01/02/22   COVID-19, Rapid    Specimen: Nasopharyngeal Swab   Result Value Ref Range    SARS-CoV-2, NAAT Not Detected Not Detected   CBC Auto Differential   Result Value Ref Range    WBC 8.2 4.5 - 11.5 E9/L    RBC 5.39 3.80 - 5.80 E12/L    Hemoglobin 18.4 (H) 12.5 - 16.5 g/dL    Hematocrit 52.7 37.0 - 54.0 %    MCV 97.8 80.0 - 99.9 fL    MCH 34.1 26.0 - 35.0 pg    MCHC 34.9 (H) 32.0 - 34.5 %    RDW 12.3 11.5 - 15.0 fL    Platelets 671 740 - 859 E9/L    MPV 9.8 7.0 - 12.0 fL    Neutrophils % 35.7 (L) 43.0 - 80.0 %    Immature Granulocytes % 0.1 0.0 - 5.0 %    Lymphocytes % 51.0 (H) 20.0 - 42.0 % Monocytes % 7.8 2.0 - 12.0 %    Eosinophils % 4.4 0.0 - 6.0 %    Basophils % 1.0 0.0 - 2.0 %    Neutrophils Absolute 2.93 1.80 - 7.30 E9/L    Immature Granulocytes # 0.01 E9/L    Lymphocytes Absolute 4.19 (H) 1.50 - 4.00 E9/L    Monocytes Absolute 0.64 0.10 - 0.95 E9/L    Eosinophils Absolute 0.36 0.05 - 0.50 E9/L    Basophils Absolute 0.08 0.00 - 0.20 Y4/R   Basic Metabolic Panel   Result Value Ref Range    Sodium 139 132 - 146 mmol/L    Potassium 4.3 3.5 - 5.0 mmol/L    Chloride 101 98 - 107 mmol/L    CO2 24 22 - 29 mmol/L    Anion Gap 14 7 - 16 mmol/L    Glucose 317 (H) 74 - 99 mg/dL    BUN 14 6 - 20 mg/dL    CREATININE 0.8 0.7 - 1.2 mg/dL    GFR Non-African American >60 >=60 mL/min/1.73    GFR African American >60     Calcium 9.7 8.6 - 10.2 mg/dL   Protime-INR   Result Value Ref Range    Protime 11.7 9.3 - 12.4 sec    INR 1.1    APTT   Result Value Ref Range    aPTT 28.5 24.5 - 35.1 sec       RADIOLOGY: Interpreted by Radiologist unless otherwise noted. XR CHEST (2 VW)   Final Result   Hazy opacities in the left lung base, which could represent atelectasis   versus developing pneumonia. Clinical correlation recommended. Oxygen Saturation Interpretation: Abnormal, but improved with supplemental oxygen. Meds Given:  Medications   cefTRIAXone (ROCEPHIN) 1 g injection (  Not Given 1/2/22 0508)   cefTRIAXone (ROCEPHIN) 1,000 mg in sterile water 10 mL IV syringe (0 mg IntraVENous Stopped 1/2/22 0509)   doxycycline (VIBRAMYCIN) 100 mg in dextrose 5 % 100 mL IVPB (0 mg IntraVENous Stopped 1/2/22 0679)   ipratropium-albuterol (DUONEB) nebulizer solution 1 ampule (1 ampule Inhalation Given 1/2/22 1026)   methylPREDNISolone sodium (SOLU-MEDROL) injection 125 mg (125 mg IntraVENous Given 1/2/22 0502)       Procedures:  No procedures performed. --------------------------------- PROGRESS NOTES / ADDITIONAL PROVIDER NOTES ---------------------------------  Consultations:  As outlined below.     ED Course:  ED Course as of 01/02/22 0649   Rebeka Waters Jan 02, 2022   9529 Nursing reports that while ambulating the patient he dropped to 83% on room air. Given that, we will admit the patient to the hospital for further evaluation and management. Call placed to internal medicine to discuss. That call will be taken by my colleague, Dr. Agatha Aguayo. [ML]   [id number] 6:26 AM EST    I have signed this patient out to the oncoming resident physician, Dr. Agatha Aguayo. I have discussed the patient's initial exam, treatment and plan of care with the oncoming physician. I have notified the patient / family of the change in treating physician and answered their questions to this point. [ML]   [id number] Patient desaturated to 83% when ambulating. At 92% at rest on RA.  [TC]      ED Course User Index  [ML] Daryl Ruiz DO  [TC] Joseph Langford MD       6:49 AM: All results were discussed with the patient and/or their surrogate and I have provided specific details regarding the plan to admit the patient. The patient was seen in the emergency department by myself and the assigned attending physician, Demetri Lopez DO, who agreed with the assessment, plan and decision to admit as laid out herein. The patient and/or family verbalized an understanding and agreement with the plan for admission and all questions were answered to the highest degree of accuracy possible based on the current information available. The patient was without objective evidence of hemodynamic instability and was therefore deemed to be in stable condition at the time of transport. This patient's ED course included: a personal history and physicial examination, re-evaluation prior to disposition, multiple bedside re-evaluations, IV medications, cardiac monitoring and continuous pulse oximetry    MDM:  Patient presented with concerns for worsening pneumonia. On arrival, all vital signs were within normal limits.   However, during his stay he did become hypoxic and was placed

## 2022-01-02 NOTE — ED NOTES
Pt walked to triage and was SOB with oxygen was 87%, when sitting still he was 91%, placed on 2L NC he is now 94%     Alber Barreto RN  01/01/22 8119

## 2022-01-02 NOTE — ED NOTES
Pt ambulated in halls pulse ox reading dropped to 83%.      [facility], Count includes the [facility]  01/02/22 2700

## 2022-01-02 NOTE — ED NOTES
Pt on room air pulse ox 93-94% states that he is feeling much better      Mina Nicholson, GOLDEN  01/02/22 1111

## 2022-01-02 NOTE — ED NOTES
Department of Emergency Medicine  FIRST PROVIDER TRIAGE NOTE             Independent MLP           1/1/22  11:54 PM EST    Date of Encounter: 1/1/22   MRN: [id number]      HPI: Brian Deras is a 49 y.o. male who presents to the ED for No chief complaint on file. Pt presents with URI symptoms for the past 2-3 days. He reports productive cough which is becoming less productive. Shortness of breath with any type of activity, nasal congestion and drainage. Denies fevers, body aches, chills, chest pain. Patient went to primary care and was started on prednisone and antibiotics. He did not have a Covid test at that time. Patient is vaccinated. PT has hx of emphysema    ROS: Negative for cp, abd pain, back pain, fever, vomiting or diarrhea. PE: Gen Appearance/Constitutional: alert  HEENT: NC/NT. PERRLA,  Airway patent. Erythema of posterior pharynx  CV: regular rate  Pulm: diminished breath sound throughout. Initial Plan of Care: All treatment areas with department are currently occupied. Plan to order/Initiate the following while awaiting opening in ED: labs, EKG, imaging studies and supplemental oxygen.     Initial Plan of Care: Initiate Treatment-Testing, Proceed toTreatment Area When Bed Available for ED Attending/MLP to Continue Care    Electronically signed by Mike Ma PA-C   DD: 1/1/22         Mike Ma PA-C  01/01/22 8472

## 2022-01-02 NOTE — H&P
[address] [facility]  History and Physical      CHIEF COMPLAINT:    Chief Complaint   Patient presents with    Shortness of Breath     87% when walking, placed on 2L NC 94%, recently at PCP placed on abx    Cough     producing yellow phlegm        Patient of Betzy Unger MD presents with:  PNA (pneumonia)    History of Present Illness:   Patient is a 62-year-old male with a past medical history of carotid stenosis, CVA, DM, emphysema, hyperlipidemia, hypertension, and PVD. Patient presented to the ED with shortness of breath and productive cough that has been ongoing for approximately 7 days. Patient states her shortness of breath worsens with exertion and is mildly relieved with rest.  Patient meds are no aggravating factors or relieving factors. Patient has been vaccinated with Covid however has not received his booster. Patient admits to cigarette smoking. Patient is alert and oriented on examination sitting up in gurney. Patient is currently utilizing 2 L O2. Patient denies any chest pain, fever, chills, headache, dizziness, blurred vision, and abdominal pain. ER work-up reveals chest x-ray possible pneumonia, elevated glucose level of 317, normal white count. Patient is admitted observation for further testing and treatment. REVIEW OF SYSTEMS:  Pertinent negatives are above in HPI. 10 point ROS otherwise negative. Past Medical History:   Diagnosis Date    Carotid stenosis     Cerebral artery occlusion with cerebral infarction ([address]     Diabetes mellitus ([address]     Emphysema     Hyperlipidemia     Hypertension     PVD (peripheral vascular disease) with claudication ([address] 2/11/2019         Past Surgical History:   Procedure Laterality Date    COLONOSCOPY      DENTAL SURGERY      TONSILLECTOMY      VASECTOMY         Medications Prior to Admission:    Not in a hospital admission.     Note that the patient's home medications were reviewed and the above list is accurate to the best of my knowledge at the time of the exam.    Allergies:    Patient has no known allergies. Social History:    reports that he has been smoking cigarettes. He has been smoking about 1.00 pack per day. His smokeless tobacco use includes snuff. He reports current alcohol use. He reports that he does not use drugs. Family History:   Patient unsure at this time. PHYSICAL EXAM:    Vitals:  BP (!) 147/92   Pulse 74   Temp 97 °F (36.1 °C)   Resp 18   SpO2 94%       General appearance: NAD, conversant  Eyes: Sclerae anicteric, PERRLA  HEENT: AT/NC, MMM  Neck: FROM, supple, no thyromegaly  Lymph: No cervical / supraclavicular lymphadenopathy  Lungs: Diminished, 2 L O2  CV: RRR, no MRGs, no lower extremity edema  Abdomen: Soft, non-tender; no masses or HSM, +BS  Extremities: FROM without synovitis. No clubbing or cyanosis of the hands. Skin: no rash, induration, lesions, or ulcers  Psych: Calm and cooperative. Normal judgement and insight. Normal mood and affect. Neuro: Alert and interactive, face symmetric, speech fluent. LABS:  All labs reviewed.   Of note:  CBC with Differential:    Lab Results   Component Value Date    WBC 8.2 01/02/2022    RBC 5.39 01/02/2022    HGB 18.4 01/02/2022    HCT 52.7 01/02/2022     01/02/2022    MCV 97.8 01/02/2022    MCH 34.1 01/02/2022    MCHC 34.9 01/02/2022    RDW 12.3 01/02/2022    LYMPHOPCT 51.0 01/02/2022    MONOPCT 7.8 01/02/2022    BASOPCT 1.0 01/02/2022    MONOSABS 0.64 01/02/2022    LYMPHSABS 4.19 01/02/2022    EOSABS 0.36 01/02/2022    BASOSABS 0.08 01/02/2022     CMP:    Lab Results   Component Value Date     01/02/2022    K 4.3 01/02/2022    K 4.5 11/07/2021     01/02/2022    CO2 24 01/02/2022    BUN 14 01/02/2022    CREATININE 0.8 01/02/2022    GFRAA >60 01/02/2022    LABGLOM >60 01/02/2022    GLUCOSE 404 01/02/2022    GLUCOSE 131 12/10/2010    PROT 7.3 11/07/2021    LABALBU 4.3 11/07/2021    LABALBU 3.8 12/10/2010 CALCIUM 9.7 01/02/2022    BILITOT 0.4 11/07/2021    ALKPHOS 81 11/07/2021    AST 21 11/07/2021    ALT 23 11/07/2021       Imaging:  CXR: Hazy opacities in the left lung base, which could represent atelectasis versus developing pneumonia. Clinical correlation recommended. EKG:  NSR    Telemetry:  I've personally reviewed the patient's telemetry:  NSR    ASSESSMENT/PLAN:  Principal Problem:    PNA (pneumonia)  Active Problems:    Hypoxia  Resolved Problems:    * No resolved hospital problems. *    28-year-old male with a past medical history of CVA, DM, emphysema, hyperlipidemia, hypertension admitted to telemetry unit with    Pneumonia  -Supplement O2 demands keeping oxygen saturation greater than 92%. -Broad-spectrum IV antibiotic Rocephin and doxy  -DuoNebs   -Monitor labs hemoglobin drops below seven transfuse as never necessary. Monitor electrolytes place as necessary  -Smoking cessation offered patient nicotine patch however he refused.  -Check strep and Legionella antigens, respiratory panel negative    Uncontrolled diabetes  -Monitor labs  -ISS glucose control  -Hypoglycemia protocol initiated  -Diabetes education  -Discuss diet modification        Medication for other comorbidities continue as appropriate dose adjustment as necessary. DVT prophylaxis  PT OT  Discharge planning  Case discussed with attending and agreed upon plan of care. Code status: Full  Requires inpatient level of care  CASSIDY Solmian CNP    1:53 PM  1/2/2022     Above note edited to reflect my thoughts     I personally saw, examined and provided care for the patient. Radiographs, labs and medication list were reviewed by me independently. The case was discussed in detail and plans for care were established. Review of Shasta DANIEL CNP, documentation was conducted and revisions were made as appropriate directly by me. I agree with the above documented exam, problem list, and plan of care.      Omar Mcnamara MD  2:20 PM  1/2/2022

## 2022-01-03 VITALS
BODY MASS INDEX: 31.93 KG/M2 | HEART RATE: 85 BPM | OXYGEN SATURATION: 93 % | DIASTOLIC BLOOD PRESSURE: 64 MMHG | SYSTOLIC BLOOD PRESSURE: 107 MMHG | WEIGHT: 210 LBS | TEMPERATURE: 97.9 F | RESPIRATION RATE: 18 BRPM

## 2022-01-03 LAB
ALBUMIN SERPL-MCNC: 3.8 G/DL (ref 3.5–5.2)
ALBUMIN SERPL-MCNC: 3.9 G/DL (ref 3.5–5.2)
ALP BLD-CCNC: 70 U/L (ref 40–129)
ALP BLD-CCNC: 74 U/L (ref 40–129)
ALT SERPL-CCNC: 24 U/L (ref 0–40)
ALT SERPL-CCNC: 27 U/L (ref 0–40)
ANION GAP SERPL CALCULATED.3IONS-SCNC: 11 MMOL/L (ref 7–16)
ANION GAP SERPL CALCULATED.3IONS-SCNC: 13 MMOL/L (ref 7–16)
ANION GAP SERPL CALCULATED.3IONS-SCNC: 13 MMOL/L (ref 7–16)
APTT: 20.6 SEC (ref 24.5–35.1)
AST SERPL-CCNC: 15 U/L (ref 0–39)
AST SERPL-CCNC: 31 U/L (ref 0–39)
BASOPHILS ABSOLUTE: 0.01 E9/L (ref 0–0.2)
BASOPHILS RELATIVE PERCENT: 0.1 % (ref 0–2)
BILIRUB SERPL-MCNC: 0.5 MG/DL (ref 0–1.2)
BILIRUB SERPL-MCNC: 0.5 MG/DL (ref 0–1.2)
BUN BLDV-MCNC: 21 MG/DL (ref 6–20)
BUN BLDV-MCNC: 21 MG/DL (ref 6–20)
BUN BLDV-MCNC: 22 MG/DL (ref 6–20)
CALCIUM SERPL-MCNC: 8.8 MG/DL (ref 8.6–10.2)
CALCIUM SERPL-MCNC: 9 MG/DL (ref 8.6–10.2)
CALCIUM SERPL-MCNC: 9 MG/DL (ref 8.6–10.2)
CHLORIDE BLD-SCNC: 102 MMOL/L (ref 98–107)
CHLORIDE BLD-SCNC: 103 MMOL/L (ref 98–107)
CHLORIDE BLD-SCNC: 105 MMOL/L (ref 98–107)
CO2: 20 MMOL/L (ref 22–29)
CO2: 22 MMOL/L (ref 22–29)
CO2: 24 MMOL/L (ref 22–29)
CREAT SERPL-MCNC: 0.7 MG/DL (ref 0.7–1.2)
EOSINOPHILS ABSOLUTE: 0 E9/L (ref 0.05–0.5)
EOSINOPHILS RELATIVE PERCENT: 0 % (ref 0–6)
GFR AFRICAN AMERICAN: >60
GFR NON-AFRICAN AMERICAN: >60 ML/MIN/1.73
GLUCOSE BLD-MCNC: 221 MG/DL (ref 74–99)
GLUCOSE BLD-MCNC: 273 MG/DL (ref 74–99)
GLUCOSE BLD-MCNC: 345 MG/DL (ref 74–99)
HBA1C MFR BLD: 8.2 % (ref 4–5.6)
HCT VFR BLD CALC: 48.8 % (ref 37–54)
HCT VFR BLD CALC: 49 % (ref 37–54)
HEMOGLOBIN: 16.8 G/DL (ref 12.5–16.5)
HEMOGLOBIN: 17 G/DL (ref 12.5–16.5)
IMMATURE GRANULOCYTES #: 0.04 E9/L
IMMATURE GRANULOCYTES %: 0.4 % (ref 0–5)
INR BLD: 1.1
LACTIC ACID: 3 MMOL/L (ref 0.5–2.2)
LACTIC ACID: 4.4 MMOL/L (ref 0.5–2.2)
LYMPHOCYTES ABSOLUTE: 2.61 E9/L (ref 1.5–4)
LYMPHOCYTES RELATIVE PERCENT: 23.8 % (ref 20–42)
MCH RBC QN AUTO: 33.8 PG (ref 26–35)
MCH RBC QN AUTO: 33.9 PG (ref 26–35)
MCHC RBC AUTO-ENTMCNC: 34.3 % (ref 32–34.5)
MCHC RBC AUTO-ENTMCNC: 34.8 % (ref 32–34.5)
MCV RBC AUTO: 97.4 FL (ref 80–99.9)
MCV RBC AUTO: 98.6 FL (ref 80–99.9)
METER GLUCOSE: 179 MG/DL (ref 74–99)
METER GLUCOSE: 217 MG/DL (ref 74–99)
METER GLUCOSE: 285 MG/DL (ref 74–99)
MONOCYTES ABSOLUTE: 0.79 E9/L (ref 0.1–0.95)
MONOCYTES RELATIVE PERCENT: 7.2 % (ref 2–12)
NEUTROPHILS ABSOLUTE: 7.53 E9/L (ref 1.8–7.3)
NEUTROPHILS RELATIVE PERCENT: 68.5 % (ref 43–80)
PDW BLD-RTO: 12.5 FL (ref 11.5–15)
PDW BLD-RTO: 12.6 FL (ref 11.5–15)
PLATELET # BLD: 159 E9/L (ref 130–450)
PLATELET # BLD: 161 E9/L (ref 130–450)
PMV BLD AUTO: 10.2 FL (ref 7–12)
PMV BLD AUTO: 9.9 FL (ref 7–12)
POTASSIUM REFLEX MAGNESIUM: 5.7 MMOL/L (ref 3.5–5)
POTASSIUM SERPL-SCNC: 4.5 MMOL/L (ref 3.5–5)
POTASSIUM SERPL-SCNC: 4.6 MMOL/L (ref 3.5–5)
POTASSIUM SERPL-SCNC: 5.7 MMOL/L (ref 3.5–5)
PROTHROMBIN TIME: 12.5 SEC (ref 9.3–12.4)
RBC # BLD: 4.97 E12/L (ref 3.8–5.8)
RBC # BLD: 5.01 E12/L (ref 3.8–5.8)
SODIUM BLD-SCNC: 135 MMOL/L (ref 132–146)
SODIUM BLD-SCNC: 138 MMOL/L (ref 132–146)
SODIUM BLD-SCNC: 140 MMOL/L (ref 132–146)
TOTAL PROTEIN: 6.2 G/DL (ref 6.4–8.3)
TOTAL PROTEIN: 6.2 G/DL (ref 6.4–8.3)
WBC # BLD: 11 E9/L (ref 4.5–11.5)
WBC # BLD: 11.5 E9/L (ref 4.5–11.5)

## 2022-01-03 PROCEDURE — 6360000002 HC RX W HCPCS: Performed by: PHYSICIAN ASSISTANT

## 2022-01-03 PROCEDURE — 80053 COMPREHEN METABOLIC PANEL: CPT

## 2022-01-03 PROCEDURE — 80048 BASIC METABOLIC PNL TOTAL CA: CPT

## 2022-01-03 PROCEDURE — 85730 THROMBOPLASTIN TIME PARTIAL: CPT

## 2022-01-03 PROCEDURE — 85027 COMPLETE CBC AUTOMATED: CPT

## 2022-01-03 PROCEDURE — 83036 HEMOGLOBIN GLYCOSYLATED A1C: CPT

## 2022-01-03 PROCEDURE — 96372 THER/PROPH/DIAG INJ SC/IM: CPT

## 2022-01-03 PROCEDURE — 6370000000 HC RX 637 (ALT 250 FOR IP): Performed by: PHYSICIAN ASSISTANT

## 2022-01-03 PROCEDURE — 2580000003 HC RX 258: Performed by: PHYSICIAN ASSISTANT

## 2022-01-03 PROCEDURE — 85610 PROTHROMBIN TIME: CPT

## 2022-01-03 PROCEDURE — 82962 GLUCOSE BLOOD TEST: CPT

## 2022-01-03 PROCEDURE — 96376 TX/PRO/DX INJ SAME DRUG ADON: CPT

## 2022-01-03 PROCEDURE — 6370000000 HC RX 637 (ALT 250 FOR IP): Performed by: FAMILY MEDICINE

## 2022-01-03 PROCEDURE — 85025 COMPLETE CBC W/AUTO DIFF WBC: CPT

## 2022-01-03 PROCEDURE — 83605 ASSAY OF LACTIC ACID: CPT

## 2022-01-03 PROCEDURE — 2580000003 HC RX 258: Performed by: FAMILY MEDICINE

## 2022-01-03 RX ORDER — SODIUM CHLORIDE 9 MG/ML
INJECTION, SOLUTION INTRAVENOUS CONTINUOUS
Status: DISCONTINUED | OUTPATIENT
Start: 2022-01-03 | End: 2022-01-03 | Stop reason: HOSPADM

## 2022-01-03 RX ORDER — NICOTINE 21 MG/24HR
1 PATCH, TRANSDERMAL 24 HOURS TRANSDERMAL DAILY
Status: DISCONTINUED | OUTPATIENT
Start: 2022-01-03 | End: 2022-01-03 | Stop reason: HOSPADM

## 2022-01-03 RX ADMIN — DOXYCYCLINE HYCLATE 100 MG: 100 CAPSULE ORAL at 11:28

## 2022-01-03 RX ADMIN — TAMSULOSIN HYDROCHLORIDE 0.4 MG: 0.4 CAPSULE ORAL at 10:33

## 2022-01-03 RX ADMIN — METFORMIN HYDROCHLORIDE 1000 MG: 1000 TABLET ORAL at 18:19

## 2022-01-03 RX ADMIN — INSULIN LISPRO 1 UNITS: 100 INJECTION, SOLUTION INTRAVENOUS; SUBCUTANEOUS at 18:23

## 2022-01-03 RX ADMIN — METFORMIN HYDROCHLORIDE 1000 MG: 1000 TABLET ORAL at 11:55

## 2022-01-03 RX ADMIN — ASPIRIN 81 MG CHEWABLE TABLET 81 MG: 81 TABLET CHEWABLE at 10:33

## 2022-01-03 RX ADMIN — ENOXAPARIN SODIUM 40 MG: 100 INJECTION SUBCUTANEOUS at 10:32

## 2022-01-03 RX ADMIN — CLOPIDOGREL BISULFATE 75 MG: 75 TABLET, FILM COATED ORAL at 10:33

## 2022-01-03 RX ADMIN — INSULIN LISPRO 2 UNITS: 100 INJECTION, SOLUTION INTRAVENOUS; SUBCUTANEOUS at 10:33

## 2022-01-03 RX ADMIN — SODIUM CHLORIDE: 9 INJECTION, SOLUTION INTRAVENOUS at 11:26

## 2022-01-03 RX ADMIN — DIVALPROEX SODIUM 250 MG: 250 TABLET, DELAYED RELEASE ORAL at 11:55

## 2022-01-03 RX ADMIN — SODIUM CHLORIDE, PRESERVATIVE FREE 1000 MG: 5 INJECTION INTRAVENOUS at 05:30

## 2022-01-03 NOTE — PROGRESS NOTES
Subjective: The patient is awake and alert. No acute events overnight. Denies chest pain, angina, SOB   He feels well, has been up and ambulating and is anxious for discharge  He indicates his blood pressure baseline runs low    Objective:    BP (!) 94/56   Pulse 85   Temp 97.4 °F (36.3 °C) (Oral)   Resp 16   Wt 210 lb (95.3 kg)   SpO2 94%   BMI 31.93 kg/m²     In: -   Out: 650   In: -   Out: 650 [Urine:650]    General appearance: NAD, conversant, pleasant  HEENT: AT/NC, MMM  Neck: FROM, supple  Lungs: Clear to auscultation  CV: RRR, no MRGs  Vasc: Radial pulses 2+  Abdomen: Soft, non-tender; no masses or HSM  Extremities: No peripheral edema or digital cyanosis  Skin: no rash, lesions or ulcers  Psych: Alert and oriented to person, place and time  Neuro: Alert and interactive     Recent Labs     01/02/22  0256 01/03/22  0541 01/03/22  1121   WBC 8.2 11.0 11.5   HGB 18.4* 16.8* 17.0*   HCT 52.7 49.0 48.8    159 161       Recent Labs     01/02/22  0256 01/03/22  0541 01/03/22  1121    135 138   K 4.3 5.7*  5.7* 4.6    102 103   CO2 24 20* 22   BUN 14 21* 21*   CREATININE 0.8 0.7 0.7   CALCIUM 9.7 8.8 9.0       Assessment:    Principal Problem:    PNA (pneumonia)  Active Problems:    Hypoxia  Resolved Problems:    * No resolved hospital problems. *      Plan:  Pneumonia  -Supplement O2 demands keeping oxygen saturation greater than 92%. Patient currently on room air.   -Broad-spectrum IV antibiotic Rocephin and doxy  -DuoNebs   -Monitor labs hemoglobin drops below seven transfuse as never necessary. Monitor electrolytes place as necessary  -Smoking cessation offered patient nicotine patch however he refused.  -Check strep and Legionella antigens, respiratory panel negative  -Elevated Lactic acid - 4.4 initiate fluids and redraw 4 hours. -Improved to 3.0, patient afebrile feels well wants to go home  Patient must follow-up with PCP within 1 week of discharge to repeat blood work  Advised to return to ER if spikes further fevers etc.     Uncontrolled diabetes  -Monitor labs  -ISS glucose control  -Hypoglycemia protocol initiated  -Diabetes education  -Discuss diet modification       DVT Prophylaxis   PT/OT  Discharge planning-okay for discharge from medicine standpoint on p.o. Levaquin once current bag of IV fluids finishes      Damien Zhanna Orellana APRN - CNP  1:45 PM     Above note edited to reflect my thoughts     I personally saw, examined and provided care for the patient. Radiographs, labs and medication list were reviewed by me independently. The case was discussed in detail and plans for care were established. Review of Shasta Orellana APRN- CNP, documentation was conducted and revisions were made as appropriate directly by me. I agree with the above documented exam, problem list, and plan of care.      Trinity Jhaveri MD  6:22 PM  1/3/2022    1/3/2022

## 2022-01-03 NOTE — PROGRESS NOTES
Admission database completed to best of this RN's ability. Care plan and education initiated. Pt denies any DME or HHC prior to admission.

## 2022-01-03 NOTE — ED NOTES
Faxed SBAR to floor, spoke with Tad who received fax. Pt ready to go.      Stephanie Gutierrez RN  01/03/22 0626

## 2022-01-04 NOTE — DISCHARGE SUMMARY
Physician Discharge Summary     Patient ID:  Cary Ventura  [id number]  49 y.o.  1965    Admit date: 1/2/2022    Discharge date and time: 1/3/2022    Admission Diagnoses:   Patient Active Problem List   Diagnosis    Asymptomatic bilateral carotid artery stenosis    PVD (peripheral vascular disease) with claudication (HCC)    Obesity (BMI 30-39. 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 male of Livia Cooper MD with a past medical history of carotid stenosis, CVA, DM, emphysema, hyperlipidemia, hypertension, and PVD. Patient presented to the ED with shortness of breath and productive cough that has been ongoing for approximately 7 days. Patient states her shortness of breath worsens with exertion and is mildly relieved with rest.    Pneumonia  -Supplement O2 demands keeping oxygen saturation greater than 92%. Patient currently on room air.   -Broad-spectrum IV antibiotic Rocephin and doxy  -DuoNebs   -Monitor labs hemoglobin drops below seven transfuse as never necessary. Monitor electrolytes place as necessary  -Smoking cessation offered patient nicotine patch however he refused.  -Check strep and Legionella antigens, respiratory panel negative  -Elevated Lactic acid - 4.4 initiate fluids and redraw 4 hours. -Improved to 3.0, patient afebrile feels well wants to go home  Patient must follow-up with PCP within 1 week of discharge to repeat blood work  Advised to return to ER if spikes further fevers etc.    Patient was discharged in stable condition.     Recent Labs     01/02/22  0256 01/03/22  0541 01/03/22  1121   WBC 8.2 11.0 11.5   HGB 18.4* 16.8* 17.0*   HCT 52.7 49.0 48.8    159 161       Recent Labs     01/03/22  0541 01/03/22  1121 01/03/22  1350    138 140   K 5.7*  5.7* 4.6 4.5    103 105   CO2 20* 22 24   BUN 21* 21* 22*   CREATININE 0.7 0.7 0.7   CALCIUM 8.8 9.0 9.0       XR CHEST (2 VW)    Result Date: 1/2/2022  EXAMINATION: TWO XRAY VIEWS OF THE CHEST 1/2/2022 12:13 am COMPARISON: None. HISTORY: ORDERING SYSTEM PROVIDED HISTORY: cough, short of breath TECHNOLOGIST PROVIDED HISTORY: Reason for exam:->cough, short of breath FINDINGS: The cardiomediastinal silhouette is normal.  There are hazy opacities in left lung base. No pneumothorax or pleural effusion. Hazy opacities in the left lung base, which could represent atelectasis versus developing pneumonia. Clinical correlation recommended. Discharge Exam:    HEENT: NCAT,  PERRLA, No JVD  Heart:  RRR, no murmurs, gallops, or rubs.   Lungs:  CTA bilaterally, no wheeze, rales or rhonchi  Abd: bowel sounds present, nontender, nondistended, no masses  Extrem:  No clubbing, cyanosis, or edema    Disposition: home     Patient Condition at Discharge: Stable    Patient Instructions:      Medication List        START taking these medications      benzonatate 100 MG capsule  Commonly known as: Tessalon Perles  Take 1 capsule by mouth 3 times daily as needed for Cough     cefdinir 300 MG capsule  Commonly known as: OMNICEF  Take 1 capsule by mouth 2 times daily for 7 days     doxycycline hyclate 100 MG tablet  Commonly known as: VIBRA-TABS  Take 1 tablet by mouth 2 times daily for 7 days            CONTINUE taking these medications      Accu-Chek Anna Marie Plus w/Device Kit     aspirin 81 MG chewable tablet  Take 1 tablet by mouth daily     atorvastatin 10 MG tablet  Commonly known as: LIPITOR  Take 1 tablet by mouth nightly     clopidogrel 75 MG tablet  Commonly known as: PLAVIX  Take 1 tablet by mouth daily     divalproex 250 MG  tablet  Commonly known as: DEPAKOTE     Flomax 0.4 MG capsule  Generic drug: tamsulosin     gabapentin 300 MG capsule  Commonly known as: NEURONTIN     metFORMIN 500 MG tablet  Commonly known as: GLUCOPHAGE  Take 2 tablets by mouth 2 times daily (with meals)     metoprolol succinate 25 MG extended release tablet  Commonly known as: TOPROL XL     SILDENAFIL CITRATE PO     Toujeo SoloStar 300 UNIT/ML Sopn  Generic drug: Insulin Glargine (1 Unit Dial)     Ventolin  (90 Base) MCG/ACT inhaler  Generic drug: albuterol sulfate HFA     vitamin B-12 100 MCG tablet  Commonly known as: CYANOCOBALAMIN               Where to Get Your Medications        Information about where to get these medications is not yet available    Ask your nurse or doctor about these medications  benzonatate 100 MG capsule  cefdinir 300 MG capsule  doxycycline hyclate 100 MG tablet       Activity: activity as tolerated  Diet: regular diet    Pt has been advised to: Follow-up with Kira Cunningham MD in 1 week. Follow-up with consultants as recommended by them    Note that over 30 minutes was spent in preparing discharge papers, discussing discharge with patient, medication review, etc.    Signed:  CASSIDY Gotti CNP  1/4/2022  3:03 AM    Above note edited to reflect my thoughts     I personally saw, examined and provided care for the patient. Radiographs, labs and medication list were reviewed by me independently. The case was discussed in detail and plans for care were established. Review of Shasta DANIEL CNP, documentation was conducted and revisions were made as appropriate directly by me. I agree with the above documented exam, problem list, and plan of care.      Aubree Davila MD  3:19 AM  1/4/2022

## 2023-03-09 DIAGNOSIS — I77.1 STENOSIS OF LEFT SUBCLAVIAN ARTERY (HCC): ICD-10-CM

## 2023-03-09 DIAGNOSIS — I65.23 ASYMPTOMATIC BILATERAL CAROTID ARTERY STENOSIS: ICD-10-CM

## 2023-03-09 DIAGNOSIS — I73.9 PVD (PERIPHERAL VASCULAR DISEASE) WITH CLAUDICATION (HCC): Primary | ICD-10-CM

## 2023-03-31 ENCOUNTER — HOSPITAL ENCOUNTER (OUTPATIENT)
Dept: CARDIOLOGY | Age: 58
Discharge: HOME | End: 2023-03-31
Payer: MEDICARE

## 2023-03-31 DIAGNOSIS — I73.9 PVD (PERIPHERAL VASCULAR DISEASE) WITH CLAUDICATION (HCC): ICD-10-CM

## 2023-03-31 DIAGNOSIS — I65.23 ASYMPTOMATIC BILATERAL CAROTID ARTERY STENOSIS: ICD-10-CM

## 2023-03-31 DIAGNOSIS — I77.1 STENOSIS OF LEFT SUBCLAVIAN ARTERY (HCC): ICD-10-CM

## 2023-03-31 PROCEDURE — 93923 UPR/LXTR ART STDY 3+ LVLS: CPT

## 2023-03-31 PROCEDURE — 93880 EXTRACRANIAL BILAT STUDY: CPT

## 2023-03-31 NOTE — PROGRESS NOTES
[facility] Heart & Vascular Lab - [facility]    This is a pre read worksheet - prior to official physician interpretation    Asher Leventhal  1965  Date of study: 3/31/23    Indication for study:  Carotid artery stenosis  Study : Bilateral Carotid Artery Duplex Examination    Duplex examination of the RIGHT carotid artery system identifies atherosclerotic plaque. The peak systolic velocity in internal carotid artery was 188 centimeters / second. The maximum end diastolic velocity was 74 centimeters / second. The ICA/CCA ratio is 2.2. The right vertebral artery has antegrade flow. Duplex examination of the LEFT carotid artery system identifies atherosclerotic plaque. The peak systolic velocity in internal carotid artery was 302 centimeters / second. The maximum end diastolic velocity was 136 centimeters / second. The ICA/CCA ratio is 2.6. The left vertebral artery has retrograde flow.     Difficult to see flow in RIGHT mid- distal ICA        LAST STUDY  1/7/2020 CTA  Rt 50  Lt 70

## 2023-04-03 ENCOUNTER — OFFICE VISIT (OUTPATIENT)
Dept: VASCULAR SURGERY | Age: 58
End: 2023-04-03
Payer: MEDICARE

## 2023-04-03 VITALS — WEIGHT: 199 LBS | HEIGHT: 68 IN | BODY MASS INDEX: 30.16 KG/M2

## 2023-04-03 DIAGNOSIS — I73.9 PVD (PERIPHERAL VASCULAR DISEASE) WITH CLAUDICATION (HCC): Primary | ICD-10-CM

## 2023-04-03 DIAGNOSIS — I65.02 STENOSIS OF LEFT VERTEBRAL ARTERY: ICD-10-CM

## 2023-04-03 DIAGNOSIS — I65.23 ASYMPTOMATIC BILATERAL CAROTID ARTERY STENOSIS: ICD-10-CM

## 2023-04-03 DIAGNOSIS — I77.1 STENOSIS OF LEFT SUBCLAVIAN ARTERY (HCC): ICD-10-CM

## 2023-04-03 PROCEDURE — 99213 OFFICE O/P EST LOW 20 MIN: CPT | Performed by: SURGERY

## 2023-04-03 RX ORDER — INSULIN DEGLUDEC 100 U/ML
INJECTION, SOLUTION SUBCUTANEOUS
COMMUNITY

## 2023-04-03 NOTE — PROGRESS NOTES
Vascular Surgery Outpatient Followup    PCP : Janelle Heart MD   Urologist: Dr. Morenita Loyd. [place]    9/21/21 Left internal iliac artery plasty  Left common femoral artery plasty         HISTORY OF PRESENT ILLNESS:    This is a 66 y.o. male who presents in fu regarding pvd and carotid disease. He is able to walk about 1 block (previously 500 feet, originally 200 feet) before he has to stop. The left leg does not bother him as much. His bilateral leg continues to be similar to before with pain in hip that radiates down his leg laterally down thigh than crossing over his knee and down to medial calf to foot. The right LE is now his limiting factor. Resting makes it better. 8/10 pain. The pain is crampy, aching, sore. Denies LE rest pain or ulceration. He denies any new symptoms of stroke, tia, localized weakness, slurred speech, or amaurosis fugax. 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 MRI was negative for an acute stroke. When he raises his chin up to shave he gets light headed which continues to be an issue. He denies any L UE claudication, rest pain, or open wounds. RUE numbnes and tingling in his fingers tips when operating machinery at work or certain position of his arm has improved with injection per pcp, supsected carpal tunnel syndrome. Patient chronically has erectile dysfunction. He has been dealing with this for many years, and states he has tried Cialis and Viagra without success. He had penile pump placed which is working well. 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 Denies significant pain associated with this. States he does not see a DPM.    He is smoking about a 1 pk/day.       Past Medical History:        Diagnosis Date    Carotid stenosis     Cerebral artery occlusion with cerebral infarction ([address]     Diabetes mellitus ([address]     Emphysema

## 2024-03-19 ENCOUNTER — APPOINTMENT (OUTPATIENT)
Dept: GENERAL RADIOLOGY | Age: 59
End: 2024-03-19
Payer: COMMERCIAL

## 2024-03-19 ENCOUNTER — HOSPITAL ENCOUNTER (EMERGENCY)
Age: 59
Discharge: HOME | End: 2024-03-19
Attending: STUDENT IN AN ORGANIZED HEALTH CARE EDUCATION/TRAINING PROGRAM
Payer: COMMERCIAL

## 2024-03-19 VITALS
OXYGEN SATURATION: 100 % | BODY MASS INDEX: 31.83 KG/M2 | SYSTOLIC BLOOD PRESSURE: 97 MMHG | HEIGHT: 68 IN | HEART RATE: 64 BPM | TEMPERATURE: 98.4 F | DIASTOLIC BLOOD PRESSURE: 65 MMHG | WEIGHT: 210 LBS | RESPIRATION RATE: 18 BRPM

## 2024-03-19 DIAGNOSIS — R07.9 CHEST PAIN, UNSPECIFIED TYPE: Primary | ICD-10-CM

## 2024-03-19 LAB
ANION GAP SERPL CALCULATED.3IONS-SCNC: 11 MMOL/L (ref 7–16)
BASOPHILS # BLD: 0.04 K/UL (ref 0–0.2)
BASOPHILS NFR BLD: 1 % (ref 0–2)
BUN SERPL-MCNC: 14 MG/DL (ref 6–20)
CALCIUM SERPL-MCNC: 9.3 MG/DL (ref 8.6–10.2)
CHLORIDE SERPL-SCNC: 103 MMOL/L (ref 98–107)
CO2 SERPL-SCNC: 23 MMOL/L (ref 22–29)
CREAT SERPL-MCNC: 0.6 MG/DL (ref 0.7–1.2)
EOSINOPHIL # BLD: 0.16 K/UL (ref 0.05–0.5)
EOSINOPHILS RELATIVE PERCENT: 3 % (ref 0–6)
ERYTHROCYTE [DISTWIDTH] IN BLOOD BY AUTOMATED COUNT: 13.2 % (ref 11.5–15)
GFR SERPL CREATININE-BSD FRML MDRD: >60 ML/MIN/1.73M2
GLUCOSE SERPL-MCNC: 184 MG/DL (ref 74–99)
HCT VFR BLD AUTO: 50.1 % (ref 37–54)
HGB BLD-MCNC: 17 G/DL (ref 12.5–16.5)
IMM GRANULOCYTES # BLD AUTO: <0.03 K/UL (ref 0–0.58)
IMM GRANULOCYTES NFR BLD: 0 % (ref 0–5)
LYMPHOCYTES NFR BLD: 2.38 K/UL (ref 1.5–4)
LYMPHOCYTES RELATIVE PERCENT: 38 % (ref 20–42)
MCH RBC QN AUTO: 34.6 PG (ref 26–35)
MCHC RBC AUTO-ENTMCNC: 33.9 G/DL (ref 32–34.5)
MCV RBC AUTO: 102 FL (ref 80–99.9)
MONOCYTES NFR BLD: 0.52 K/UL (ref 0.1–0.95)
MONOCYTES NFR BLD: 8 % (ref 2–12)
NEUTROPHILS NFR BLD: 51 % (ref 43–80)
NEUTS SEG NFR BLD: 3.2 K/UL (ref 1.8–7.3)
PLATELET # BLD AUTO: 141 K/UL (ref 130–450)
PMV BLD AUTO: 9.2 FL (ref 7–12)
POTASSIUM SERPL-SCNC: 4.1 MMOL/L (ref 3.5–5)
RBC # BLD AUTO: 4.91 M/UL (ref 3.8–5.8)
SODIUM SERPL-SCNC: 137 MMOL/L (ref 132–146)
TROPONIN I SERPL HS-MCNC: 10 NG/L (ref 0–11)
TROPONIN I SERPL HS-MCNC: 10 NG/L (ref 0–11)
WBC OTHER # BLD: 6.3 K/UL (ref 4.5–11.5)

## 2024-03-19 PROCEDURE — 99285 EMERGENCY DEPT VISIT HI MDM: CPT

## 2024-03-19 PROCEDURE — 85025 COMPLETE CBC W/AUTO DIFF WBC: CPT

## 2024-03-19 PROCEDURE — 84484 ASSAY OF TROPONIN QUANT: CPT

## 2024-03-19 PROCEDURE — 71045 X-RAY EXAM CHEST 1 VIEW: CPT

## 2024-03-19 PROCEDURE — 93005 ELECTROCARDIOGRAM TRACING: CPT | Performed by: PHYSICIAN ASSISTANT

## 2024-03-19 PROCEDURE — 80048 BASIC METABOLIC PNL TOTAL CA: CPT

## 2024-03-19 ASSESSMENT — PAIN - FUNCTIONAL ASSESSMENT
PAIN_FUNCTIONAL_ASSESSMENT: 0-10
PAIN_FUNCTIONAL_ASSESSMENT: NONE - DENIES PAIN

## 2024-03-19 ASSESSMENT — LIFESTYLE VARIABLES
HOW OFTEN DO YOU HAVE A DRINK CONTAINING ALCOHOL: NEVER
HOW MANY STANDARD DRINKS CONTAINING ALCOHOL DO YOU HAVE ON A TYPICAL DAY: PATIENT DOES NOT DRINK

## 2024-03-19 ASSESSMENT — PAIN SCALES - GENERAL: PAINLEVEL_OUTOF10: 2

## 2024-03-19 NOTE — ED PROVIDER NOTES
ATTENDING PROVIDER ATTESTATION:     Kyle Cheng presented to the emergency department for evaluation of Chest Pain (Midsternal. Intermittent since yesterday. Hx anxiety. Pt reports non-radiating achy sensation. Denies SOB)    I have reviewed and discussed the case, including pertinent history (medical, surgical, family and social) and exam findings with the Resident and the Nurse assigned to Kyle Cheng.  I have personally performed and/or participated in the history, exam, medical decision making, and procedures and agree with all pertinent clinical information.  I agree with any EKG interpretation by resident.      I have reviewed my findings and recommendations with Kyle Cheng and members of family present at the time of disposition.          I, Dr. Molina, am the primary provider of record    My findings/plan: The encounter diagnosis was Chest pain, unspecified type.  Discharge Medication List as of 3/19/2024  9:45 PM        Kelly Molina MD      Department of Emergency Medicine     Written by: Romeo Perez MD  Patient Name: Kyle Cheng  Admit Date: 3/19/2024  5:42 PM  MRN: [id number]                   : 1965    HPI  Chief Complaint   Patient presents with    Chest Pain     Midsternal. Intermittent since yesterday. Hx anxiety. Pt reports non-radiating achy sensation. Denies SOB       Kyle Cheng is a 59 y.o. male that presents to the ED with midsternal chest pressure.  Started yesterday.  Patient with history anxiety, he says it feels like his anxiety however wants to make sure it is nothing more.  Patient has no shortness of breath.  No recent fevers chills diaphoresis.  No cough congestion rhinorrhea.  No abdominal pain nausea vomiting.  No dysuria, constipation and diarrhea.  Patient has no history of alcohol or drug abuse, however has been smoking a pack a day since he was 15, about 45 years right now.  He is compliant with his Plavix.     Review of systems:  Pertinent

## 2024-03-19 NOTE — ED TRIAGE NOTES
Department of Emergency Medicine    FIRST PROVIDER TRIAGE NOTE             Independent MLP           3/19/24  3:41 PM EDT    Date of Encounter: 3/19/24   MRN: [id number]    Vitals:    03/19/24 1515 03/19/24 1542   BP:  (!) 150/66   Pulse:  87   Resp:  16   Temp:  98.4 °F (36.9 °C)   SpO2:  95%   Weight: 95.3 kg (210 lb)    Height: 1.727 m (5' 8\")       HPI: Kyle Cheng is a 59 y.o. male who presents to the ED for Chest Pain (Midsternal. Intermittent since yesterday. Hx anxiety. Pt reports non-radiating achy sensation. Denies SOB)     ROS: Negative for abd pain, vomiting, or headache.    Physical Exam:   Gen Appearance/Constitutional: alert  CV: regular rate     Initial Plan of Care: All treatment areas with department are currently occupied.     Plan to order/Initiate the following while awaiting opening in ED: Triage evaluation  EKG.    Initial Plan of Care: Initiate Treatment-Testing, Proceed toTreatment Area When Bed Available for ED Attending/MLP to Continue Care    Electronically signed by Namita Dixon PA-C   DD: 3/19/24

## 2024-03-20 LAB
EKG ATRIAL RATE: 65 BPM
EKG P AXIS: 56 DEGREES
EKG P-R INTERVAL: 204 MS
EKG Q-T INTERVAL: 388 MS
EKG QRS DURATION: 104 MS
EKG QTC CALCULATION (BAZETT): 403 MS
EKG R AXIS: 119 DEGREES
EKG T AXIS: 64 DEGREES
EKG VENTRICULAR RATE: 65 BPM

## 2024-03-20 PROCEDURE — 93010 ELECTROCARDIOGRAM REPORT: CPT | Performed by: INTERNAL MEDICINE

## 2024-04-08 ENCOUNTER — HOSPITAL ENCOUNTER (OUTPATIENT)
Dept: CARDIOLOGY | Age: 59
Discharge: HOME | End: 2024-04-10
Payer: MEDICARE

## 2024-04-08 ENCOUNTER — OFFICE VISIT (OUTPATIENT)
Dept: VASCULAR SURGERY | Age: 59
End: 2024-04-08
Payer: COMMERCIAL

## 2024-04-08 VITALS — WEIGHT: 210 LBS | BODY MASS INDEX: 31.93 KG/M2

## 2024-04-08 DIAGNOSIS — I65.23 CAROTID STENOSIS, ASYMPTOMATIC, BILATERAL: ICD-10-CM

## 2024-04-08 DIAGNOSIS — I73.9 PVD (PERIPHERAL VASCULAR DISEASE) WITH CLAUDICATION (HCC): Primary | ICD-10-CM

## 2024-04-08 DIAGNOSIS — I65.23 ASYMPTOMATIC BILATERAL CAROTID ARTERY STENOSIS: ICD-10-CM

## 2024-04-08 DIAGNOSIS — I65.02 STENOSIS OF LEFT VERTEBRAL ARTERY: ICD-10-CM

## 2024-04-08 LAB
VAS LEFT ABI: 0.94
VAS LEFT ARM BP: 95 MMHG
VAS LEFT CCA DIST EDV: 12.1 CM/S
VAS LEFT CCA DIST PSV: 44.1 CM/S
VAS LEFT CCA PROX EDV: 17.9 CM/S
VAS LEFT CCA PROX PSV: 113.4 CM/S
VAS LEFT DORSALIS PEDIS BP: 123 MMHG
VAS LEFT ECA EDV: 64.9 CM/S
VAS LEFT ECA PSV: 466.8 CM/S
VAS LEFT ICA DIST EDV: 57.7 CM/S
VAS LEFT ICA DIST PSV: 232.1 CM/S
VAS LEFT ICA MID EDV: 99.7 CM/S
VAS LEFT ICA MID PSV: 341.1 CM/S
VAS LEFT ICA PROX EDV: 34.5 CM/S
VAS LEFT ICA PROX PSV: 103.9 CM/S
VAS LEFT ICA/CCA PSV: 3 NO UNITS
VAS LEFT PTA BP: 131 MMHG
VAS LEFT TBI: 0.65
VAS LEFT TOE PRESSURE: 91 MMHG
VAS LEFT VERTEBRAL EDV: 0 CM/S
VAS LEFT VERTEBRAL PSV: 29.1 CM/S
VAS RIGHT ABI: 0.55
VAS RIGHT ARM BP: 139 MMHG
VAS RIGHT CCA DIST EDV: 18.3 CM/S
VAS RIGHT CCA DIST PSV: 88.1 CM/S
VAS RIGHT CCA PROX EDV: 18.1 CM/S
VAS RIGHT CCA PROX PSV: 142.2 CM/S
VAS RIGHT DORSALIS PEDIS BP: 65 MMHG
VAS RIGHT ECA EDV: 15.8 CM/S
VAS RIGHT ECA PSV: 199.3 CM/S
VAS RIGHT ICA DIST EDV: 19.3 CM/S
VAS RIGHT ICA DIST PSV: 77.8 CM/S
VAS RIGHT ICA MID EDV: 70 CM/S
VAS RIGHT ICA MID PSV: 207.3 CM/S
VAS RIGHT ICA PROX EDV: 19.3 CM/S
VAS RIGHT ICA PROX PSV: 70.2 CM/S
VAS RIGHT ICA/CCA PSV: 1.5 NO UNITS
VAS RIGHT PTA BP: 77 MMHG
VAS RIGHT TBI: 0.5
VAS RIGHT TOE PRESSURE: 69 MMHG
VAS RIGHT VERTEBRAL EDV: 23.4 CM/S
VAS RIGHT VERTEBRAL PSV: 70.6 CM/S

## 2024-04-08 PROCEDURE — 93923 UPR/LXTR ART STDY 3+ LVLS: CPT | Performed by: SURGERY

## 2024-04-08 PROCEDURE — 93923 UPR/LXTR ART STDY 3+ LVLS: CPT

## 2024-04-08 PROCEDURE — 93880 EXTRACRANIAL BILAT STUDY: CPT

## 2024-04-08 PROCEDURE — 99213 OFFICE O/P EST LOW 20 MIN: CPT | Performed by: SURGERY

## 2024-04-08 PROCEDURE — G8417 CALC BMI ABV UP PARAM F/U: HCPCS | Performed by: SURGERY

## 2024-04-08 PROCEDURE — G8427 DOCREV CUR MEDS BY ELIG CLIN: HCPCS | Performed by: SURGERY

## 2024-04-08 PROCEDURE — 93880 EXTRACRANIAL BILAT STUDY: CPT | Performed by: SURGERY

## 2024-04-08 PROCEDURE — 4004F PT TOBACCO SCREEN RCVD TLK: CPT | Performed by: SURGERY

## 2024-04-08 PROCEDURE — 3017F COLORECTAL CA SCREEN DOC REV: CPT | Performed by: SURGERY

## 2024-04-08 NOTE — PROGRESS NOTES
Vascular Surgery Outpatient Followup    PCP : Jed Umanzor MD   Urologist: Dr. TATI Tang  Podiatirst : Dr. Cleaning    9/21/21 Left internal iliac artery plasty  Left common femoral artery plasty         HISTORY OF PRESENT ILLNESS:    This is a 59 y.o. male who presents in fu regarding pvd and carotid disease.    He is down to 1/2 a block (previously was at 1 block) with his walking in bilateral LE. His bilateral leg continues to be similar to before with pain in hip that radiates down his leg laterally down thigh than crossing over his knee and down to medial calf to foot.  Resting makes it better.  7/10 pain.  The pain is crampy, aching, sore.  Denies LE rest pain or ulceration.    His previously present right calf claudication doesn't seem to be bothering him.      He denies any new symptoms of stroke, tia, localized weakness, slurred speech, or amaurosis fugax. 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 MRI was negative for an acute stroke.     When he raises his chin up to shave he gets light headed which continues to be an issue.  He denies any L UE claudication, rest pain, or open wounds.    He had surgery done on right wrist for carpal tunnel syndrome, and is doing well postop.  He plans to have left side done.      Patient chronically has erectile dysfunction.  He has been dealing with this for many years, and states he has tried Cialis and Viagra without success.  He had penile pump placed which is working well.      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  Denies significant pain associated with this.       He is smoking about a 1 pk/day.      Past Medical History:        Diagnosis Date    Carotid stenosis     Cerebral artery occlusion with cerebral infarction (HCC)     Diabetes mellitus (HCC)     Emphysema     Hyperlipidemia     Hypertension     PVD (peripheral vascular disease) with

## 2025-03-26 DIAGNOSIS — I73.9 PVD (PERIPHERAL VASCULAR DISEASE) WITH CLAUDICATION: Primary | Chronic | ICD-10-CM

## 2025-03-26 DIAGNOSIS — I65.23 ASYMPTOMATIC BILATERAL CAROTID ARTERY STENOSIS: ICD-10-CM

## 2025-04-14 ENCOUNTER — TELEPHONE (OUTPATIENT)
Dept: VASCULAR SURGERY | Age: 60
End: 2025-04-14

## 2025-04-14 ENCOUNTER — OFFICE VISIT (OUTPATIENT)
Dept: VASCULAR SURGERY | Age: 60
End: 2025-04-14
Payer: MEDICARE

## 2025-04-14 ENCOUNTER — HOSPITAL ENCOUNTER (OUTPATIENT)
Dept: CARDIOLOGY | Age: 60
Discharge: HOME | End: 2025-04-16
Attending: SURGERY
Payer: MEDICARE

## 2025-04-14 DIAGNOSIS — I65.23 ASYMPTOMATIC BILATERAL CAROTID ARTERY STENOSIS: Primary | ICD-10-CM

## 2025-04-14 DIAGNOSIS — I65.23 ASYMPTOMATIC BILATERAL CAROTID ARTERY STENOSIS: ICD-10-CM

## 2025-04-14 DIAGNOSIS — I73.9 PVD (PERIPHERAL VASCULAR DISEASE) WITH CLAUDICATION: Chronic | ICD-10-CM

## 2025-04-14 LAB
VAS LEFT ABI: 0.82
VAS LEFT ARM BP: 99 MMHG
VAS LEFT CCA DIST EDV: 13 CM/S
VAS LEFT CCA DIST PSV: 45.8 CM/S
VAS LEFT CCA PROX EDV: 14.2 CM/S
VAS LEFT CCA PROX PSV: 104.4 CM/S
VAS LEFT DORSALIS PEDIS BP: 94 MMHG
VAS LEFT ECA EDV: 96.1 CM/S
VAS LEFT ECA PSV: 428.8 CM/S
VAS LEFT ICA DIST EDV: 40.1 CM/S
VAS LEFT ICA DIST PSV: 274.6 CM/S
VAS LEFT ICA MID EDV: 101.4 CM/S
VAS LEFT ICA MID PSV: 300 CM/S
VAS LEFT ICA PROX EDV: 133.6 CM/S
VAS LEFT ICA PROX PSV: 364.4 CM/S
VAS LEFT ICA/CCA PSV: 3.5 NO UNITS
VAS LEFT PTA BP: 107 MMHG
VAS LEFT TBI: 0.73
VAS LEFT TOE PRESSURE: 95 MMHG
VAS LEFT VERTEBRAL EDV: 0 CM/S
VAS LEFT VERTEBRAL PSV: 22.1 CM/S
VAS RIGHT ABI: 0.67
VAS RIGHT ARM BP: 130 MMHG
VAS RIGHT CCA DIST EDV: 17 CM/S
VAS RIGHT CCA DIST PSV: 78.3 CM/S
VAS RIGHT CCA PROX EDV: 19.4 CM/S
VAS RIGHT CCA PROX PSV: 143.5 CM/S
VAS RIGHT DORSALIS PEDIS BP: 82 MMHG
VAS RIGHT ECA EDV: 15.2 CM/S
VAS RIGHT ECA PSV: 172.4 CM/S
VAS RIGHT ICA DIST EDV: 30.1 CM/S
VAS RIGHT ICA DIST PSV: 114.6 CM/S
VAS RIGHT ICA MID EDV: 76.4 CM/S
VAS RIGHT ICA MID PSV: 255.3 CM/S
VAS RIGHT ICA PROX EDV: 22.9 CM/S
VAS RIGHT ICA PROX PSV: 75 CM/S
VAS RIGHT ICA/CCA PSV: 1.8 NO UNITS
VAS RIGHT PTA BP: 87 MMHG
VAS RIGHT TBI: 0.6
VAS RIGHT TOE PRESSURE: 78 MMHG
VAS RIGHT VERTEBRAL EDV: 27 CM/S
VAS RIGHT VERTEBRAL PSV: 88.7 CM/S

## 2025-04-14 PROCEDURE — G8427 DOCREV CUR MEDS BY ELIG CLIN: HCPCS

## 2025-04-14 PROCEDURE — 93880 EXTRACRANIAL BILAT STUDY: CPT | Performed by: SURGERY

## 2025-04-14 PROCEDURE — 93880 EXTRACRANIAL BILAT STUDY: CPT

## 2025-04-14 PROCEDURE — 93923 UPR/LXTR ART STDY 3+ LVLS: CPT

## 2025-04-14 PROCEDURE — 99213 OFFICE O/P EST LOW 20 MIN: CPT

## 2025-04-14 PROCEDURE — G8421 BMI NOT CALCULATED: HCPCS

## 2025-04-14 PROCEDURE — 4004F PT TOBACCO SCREEN RCVD TLK: CPT

## 2025-04-14 PROCEDURE — 3017F COLORECTAL CA SCREEN DOC REV: CPT

## 2025-04-14 RX ORDER — NICOTINE 21 MG/24HR
1 PATCH, TRANSDERMAL 24 HOURS TRANSDERMAL DAILY
Qty: 14 PATCH | Refills: 5 | Status: SHIPPED | OUTPATIENT
Start: 2025-04-14 | End: 2025-07-07

## 2025-04-14 NOTE — PROGRESS NOTES
Vascular Surgery Outpatient Followup    PCP : Jed Umanzor MD   Urologist: Dr. TATI Tang  Podiatirst : Dr. Cleaning    9/21/21 Left internal iliac artery plasty  Left common femoral artery plasty         HISTORY OF PRESENT ILLNESS:    This is a 60 y.o. male who presents in fu regarding pvd and carotid disease.    He is able to walk about 1 block before developing right calf claudication. His bilateral leg continues to be similar to before with pain in hip that radiates down his leg laterally down thigh than crossing over his knee and down to medial calf to foot.  Resting makes it better.  7/10 pain.  The pain is crampy, aching, sore.  Denies LE rest pain or ulceration.    His previously present right calf claudication doesn't seem to be bothering him.      He denies any new symptoms of stroke, tia, localized weakness, slurred speech, or amaurosis fugax. 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 MRI was negative for an acute stroke.      When he raises his chin up to shave he gets light headed which continues to be an issue.  He feels his dizziness has increased.  He denies any L UE claudication, rest pain, or open wounds.    Patient chronically has erectile dysfunction.  He has been dealing with this for many years, and states he has tried Cialis and Viagra without success.  He had penile pump placed which is working well.      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  Denies significant pain associated with this.       He is smoking about a 1 pk/day.      Past Medical History:        Diagnosis Date    Carotid stenosis     Cerebral artery occlusion with cerebral infarction (HCC)     Diabetes mellitus (HCC)     Emphysema     Hyperlipidemia     Hypertension     PVD (peripheral vascular disease) with claudication 2/11/2019     Past Surgical History:        Procedure Laterality Date    COLONOSCOPY

## 2025-04-14 NOTE — TELEPHONE ENCOUNTER
Scheduled CTA neck at [facility] 5/8/25 at 7:30 a.m.  Pt notified and instructed to report to [facility] at 7:00 a.m, be NPO x 3 hours prior, hold metformin day of and 2 days after and to have labs drawn.

## 2025-04-14 NOTE — TELEPHONE ENCOUNTER
Scheduled CTA neck at [facility] 5/8/25 at 7:30 a.m, attempted to call the pt, no answer, no voicemail.

## 2025-04-21 PROBLEM — R09.02 HYPOXIA: Status: RESOLVED | Noted: 2022-01-02 | Resolved: 2025-04-21

## 2025-04-21 PROBLEM — I73.9 PVD (PERIPHERAL VASCULAR DISEASE): Status: RESOLVED | Noted: 2021-09-21 | Resolved: 2025-04-21

## 2025-04-21 PROBLEM — J18.9 PNA (PNEUMONIA): Status: RESOLVED | Noted: 2022-01-02 | Resolved: 2025-04-21

## 2025-04-22 ENCOUNTER — OFFICE VISIT (OUTPATIENT)
Dept: CARDIOLOGY CLINIC | Age: 60
End: 2025-04-22
Payer: MEDICARE

## 2025-04-22 VITALS — WEIGHT: 188.4 LBS | BODY MASS INDEX: 28.55 KG/M2 | HEIGHT: 68 IN | HEART RATE: 54 BPM | RESPIRATION RATE: 18 BRPM

## 2025-04-22 DIAGNOSIS — Z01.810 PRE-OPERATIVE CARDIOVASCULAR EXAMINATION: ICD-10-CM

## 2025-04-22 DIAGNOSIS — Z01.810 PREOP CARDIOVASCULAR EXAM: Primary | ICD-10-CM

## 2025-04-22 DIAGNOSIS — R94.31 ABNORMAL ELECTROCARDIOGRAPHY: ICD-10-CM

## 2025-04-22 DIAGNOSIS — I65.23 ASYMPTOMATIC BILATERAL CAROTID ARTERY STENOSIS: ICD-10-CM

## 2025-04-22 PROCEDURE — 93000 ELECTROCARDIOGRAM COMPLETE: CPT | Performed by: INTERNAL MEDICINE

## 2025-04-22 PROCEDURE — G8427 DOCREV CUR MEDS BY ELIG CLIN: HCPCS | Performed by: INTERNAL MEDICINE

## 2025-04-22 PROCEDURE — 4004F PT TOBACCO SCREEN RCVD TLK: CPT | Performed by: INTERNAL MEDICINE

## 2025-04-22 PROCEDURE — 3017F COLORECTAL CA SCREEN DOC REV: CPT | Performed by: INTERNAL MEDICINE

## 2025-04-22 PROCEDURE — G8417 CALC BMI ABV UP PARAM F/U: HCPCS | Performed by: INTERNAL MEDICINE

## 2025-04-22 PROCEDURE — 99204 OFFICE O/P NEW MOD 45 MIN: CPT | Performed by: INTERNAL MEDICINE

## 2025-04-22 RX ORDER — VENLAFAXINE HYDROCHLORIDE 75 MG/1
CAPSULE, EXTENDED RELEASE ORAL
COMMUNITY
Start: 2025-04-03

## 2025-04-22 RX ORDER — INSULIN LISPRO 100 [IU]/ML
INJECTION, SOLUTION INTRAVENOUS; SUBCUTANEOUS
COMMUNITY
Start: 2025-02-18

## 2025-04-22 RX ORDER — LISINOPRIL 2.5 MG/1
2.5 TABLET ORAL DAILY
COMMUNITY

## 2025-04-22 RX ORDER — LANCETS
EACH MISCELLANEOUS
COMMUNITY
Start: 2025-02-19 | End: 2025-04-22 | Stop reason: CLARIF

## 2025-04-22 RX ORDER — REGADENOSON 0.08 MG/ML
0.4 INJECTION, SOLUTION INTRAVENOUS
OUTPATIENT
Start: 2025-04-22

## 2025-04-22 RX ORDER — SAXAGLIPTIN 5 MG/1
1 TABLET, FILM COATED ORAL DAILY
COMMUNITY
Start: 2024-08-02

## 2025-04-22 RX ORDER — PEN NEEDLE, DIABETIC 31 GX5/16"
NEEDLE, DISPOSABLE MISCELLANEOUS
COMMUNITY
Start: 2025-02-28

## 2025-04-22 NOTE — PROGRESS NOTES
MG/24HR Place 1 patch onto the skin daily (Patient not taking: Reported on 4/22/2025) 14 patch 5     No current facility-administered medications for this visit.        No Known Allergies    Vitals:    04/22/25 0749   Pulse: 54   Resp: 18   Weight: 85.5 kg (188 lb 6.4 oz)   Height: 1.727 m (5' 8\")             SUBJECTIVE: Kyle Cheng presents to the office today for consult for pre-op evaluation prior to carotid surgery with Dr. ALEXIS  I reviewed old records, and most pointedly, the abnormal nuclear stress of 2021  Pt has diffuse vascular disease, with activities limited by claudication.  He does walk at work, and can climb 25 steps, a little winded, but no classic angina    He complains of no new or unstable cardiac symptoms, does have mild palpitatoins,   and denies rest chest pain, orthopnea, and syncope.  Last LDL in chart 2022 -41.   Normal renal function  smoker          Physical Exam   Pulse 54   Resp 18   Ht 1.727 m (5' 8\")   Wt 85.5 kg (188 lb 6.4 oz)   BMI 28.65 kg/m²   Constitutional: Oriented to person, place, and time. No distress.    Neck: No carotid bruit, no JVD present.  Cardiovascular: Normal rate, regular rhythm, normal heart sounds and absent pedal pulse, marked decrease in left radial pulse. Hand warm    No murmur heard.  Pulmonary/Chest: Effort normal and breath sounds normal.    Abdominal: Soft. Bowel sounds are normal.  Musculoskeletal: No edema   Neurological: Alert and oriented to person, place, and time.   Skin: Skin is warm and dry.   Psychiatric: Normal mood and affect. Behavior is normal.     EKG:  sinus bradycardia, IRBBB, 1st degree AV block, right axis deviation, no change.    ASSESSMENT AND PLAN:  Patient Active Problem List   Diagnosis    Asymptomatic bilateral carotid artery stenosis    PVD (peripheral vascular disease) with claudication    Obesity (BMI 30-39.9)    Diabetes mellitus type 2, uncontrolled    Essential hypertension    Stroke determined by clinical assessment

## 2025-04-28 ENCOUNTER — HOSPITAL ENCOUNTER (OUTPATIENT)
Age: 60
Discharge: HOME | End: 2025-04-28
Payer: MEDICARE

## 2025-04-28 ENCOUNTER — TELEPHONE (OUTPATIENT)
Dept: CARDIOLOGY | Age: 60
End: 2025-04-28

## 2025-04-28 DIAGNOSIS — I65.23 ASYMPTOMATIC BILATERAL CAROTID ARTERY STENOSIS: ICD-10-CM

## 2025-04-28 LAB
ANION GAP SERPL CALCULATED.3IONS-SCNC: 10 MMOL/L (ref 7–16)
BUN SERPL-MCNC: 17 MG/DL (ref 6–20)
CALCIUM SERPL-MCNC: 10 MG/DL (ref 8.6–10.2)
CHLORIDE SERPL-SCNC: 104 MMOL/L (ref 98–107)
CO2 SERPL-SCNC: 24 MMOL/L (ref 22–29)
CREAT SERPL-MCNC: 0.6 MG/DL (ref 0.7–1.2)
GFR, ESTIMATED: >90 ML/MIN/1.73M2
GLUCOSE SERPL-MCNC: 105 MG/DL (ref 74–99)
POTASSIUM SERPL-SCNC: 4.5 MMOL/L (ref 3.5–5)
SODIUM SERPL-SCNC: 138 MMOL/L (ref 132–146)

## 2025-04-28 PROCEDURE — 36415 COLL VENOUS BLD VENIPUNCTURE: CPT

## 2025-04-28 PROCEDURE — 80048 BASIC METABOLIC PNL TOTAL CA: CPT

## 2025-04-28 NOTE — TELEPHONE ENCOUNTER
Spoke with patient and confirmed pharmacological stress test appointment on April 30, 2025 at 0700. Instructions for test,including holding all diabetic medications morning of test and bringing albuterol inhaler to appointment, and COVID-19 preprocedure information reviewed with patient, questions answered. Patient verbalized understanding. Also instructed to call office if unable to keep appointment.

## 2025-04-28 NOTE — TELEPHONE ENCOUNTER
Attempted to reach patient for stress test instructions but no answer. Unable to leave message d/t no voicemail set up. Tried calling significant other but she has not talked to patient since December and has a number for him that is no longer in use. Will try again later to reach patient.

## 2025-04-30 ENCOUNTER — RESULTS FOLLOW-UP (OUTPATIENT)
Dept: CARDIOLOGY CLINIC | Age: 60
End: 2025-04-30

## 2025-04-30 ENCOUNTER — HOSPITAL ENCOUNTER (OUTPATIENT)
Dept: CARDIOLOGY | Age: 60
Discharge: HOME | End: 2025-05-02
Payer: MEDICARE

## 2025-04-30 VITALS
HEART RATE: 56 BPM | BODY MASS INDEX: 28.49 KG/M2 | RESPIRATION RATE: 20 BRPM | SYSTOLIC BLOOD PRESSURE: 110 MMHG | WEIGHT: 188 LBS | DIASTOLIC BLOOD PRESSURE: 74 MMHG | HEIGHT: 68 IN

## 2025-04-30 DIAGNOSIS — R94.31 ABNORMAL ELECTROCARDIOGRAPHY: ICD-10-CM

## 2025-04-30 DIAGNOSIS — Z01.810 PRE-OPERATIVE CARDIOVASCULAR EXAMINATION: ICD-10-CM

## 2025-04-30 LAB
ECHO BSA: 2.02 M2
NUC STRESS EJECTION FRACTION: 66 %
STRESS BASELINE DIAS BP: 74 MMHG
STRESS BASELINE HR: 63 BPM
STRESS BASELINE SYS BP: 110 MMHG
STRESS ESTIMATED WORKLOAD: 1 METS
STRESS PEAK DIAS BP: 60 MMHG
STRESS PEAK SYS BP: 118 MMHG
STRESS PERCENT HR ACHIEVED: 52 %
STRESS POST PEAK HR: 85 BPM
STRESS RATE PRESSURE PRODUCT: NORMAL BPM*MMHG
STRESS TARGET HR: 162 BPM

## 2025-04-30 PROCEDURE — 93017 CV STRESS TEST TRACING ONLY: CPT

## 2025-04-30 PROCEDURE — 2500000003 HC RX 250 WO HCPCS: Performed by: INTERNAL MEDICINE

## 2025-04-30 PROCEDURE — 93016 CV STRESS TEST SUPVJ ONLY: CPT | Performed by: INTERNAL MEDICINE

## 2025-04-30 PROCEDURE — 6360000002 HC RX W HCPCS: Performed by: INTERNAL MEDICINE

## 2025-04-30 PROCEDURE — 3430000000 HC RX DIAGNOSTIC RADIOPHARMACEUTICAL: Performed by: INTERNAL MEDICINE

## 2025-04-30 PROCEDURE — A9500 TC99M SESTAMIBI: HCPCS | Performed by: INTERNAL MEDICINE

## 2025-04-30 PROCEDURE — 78452 HT MUSCLE IMAGE SPECT MULT: CPT | Performed by: INTERNAL MEDICINE

## 2025-04-30 PROCEDURE — 93018 CV STRESS TEST I&R ONLY: CPT | Performed by: INTERNAL MEDICINE

## 2025-04-30 RX ORDER — TETRAKIS(2-METHOXYISOBUTYLISOCYANIDE)COPPER(I) TETRAFLUOROBORATE 1 MG/ML
35.6 INJECTION, POWDER, LYOPHILIZED, FOR SOLUTION INTRAVENOUS
Status: COMPLETED | OUTPATIENT
Start: 2025-04-30 | End: 2025-04-30

## 2025-04-30 RX ORDER — REGADENOSON 0.08 MG/ML
0.4 INJECTION, SOLUTION INTRAVENOUS
Status: COMPLETED | OUTPATIENT
Start: 2025-04-30 | End: 2025-04-30

## 2025-04-30 RX ORDER — SODIUM CHLORIDE 0.9 % (FLUSH) 0.9 %
10 SYRINGE (ML) INJECTION PRN
Status: DISCONTINUED | OUTPATIENT
Start: 2025-04-30 | End: 2025-05-03 | Stop reason: HOSPADM

## 2025-04-30 RX ORDER — TETRAKIS(2-METHOXYISOBUTYLISOCYANIDE)COPPER(I) TETRAFLUOROBORATE 1 MG/ML
10.8 INJECTION, POWDER, LYOPHILIZED, FOR SOLUTION INTRAVENOUS
Status: COMPLETED | OUTPATIENT
Start: 2025-04-30 | End: 2025-04-30

## 2025-04-30 RX ADMIN — SODIUM CHLORIDE, PRESERVATIVE FREE 10 ML: 5 INJECTION INTRAVENOUS at 07:06

## 2025-04-30 RX ADMIN — Medication 35.6 MILLICURIE: at 08:04

## 2025-04-30 RX ADMIN — Medication 10.8 MILLICURIE: at 07:06

## 2025-04-30 RX ADMIN — SODIUM CHLORIDE, PRESERVATIVE FREE 10 ML: 5 INJECTION INTRAVENOUS at 08:05

## 2025-04-30 RX ADMIN — REGADENOSON 0.4 MG: 0.08 INJECTION, SOLUTION INTRAVENOUS at 08:04

## 2025-04-30 RX ADMIN — SODIUM CHLORIDE, PRESERVATIVE FREE 10 ML: 5 INJECTION INTRAVENOUS at 08:04

## 2025-05-08 ENCOUNTER — HOSPITAL ENCOUNTER (OUTPATIENT)
Dept: CT IMAGING | Age: 60
Discharge: HOME | End: 2025-05-10
Payer: MEDICARE

## 2025-05-08 ENCOUNTER — CLINICAL DOCUMENTATION (OUTPATIENT)
Dept: VASCULAR SURGERY | Age: 60
End: 2025-05-08

## 2025-05-08 DIAGNOSIS — I65.23 ASYMPTOMATIC BILATERAL CAROTID ARTERY STENOSIS: ICD-10-CM

## 2025-05-08 PROCEDURE — 6360000004 HC RX CONTRAST MEDICATION: Performed by: RADIOLOGY

## 2025-05-08 PROCEDURE — 2500000003 HC RX 250 WO HCPCS: Performed by: RADIOLOGY

## 2025-05-08 PROCEDURE — 70498 CT ANGIOGRAPHY NECK: CPT

## 2025-05-08 RX ORDER — SODIUM CHLORIDE 0.9 % (FLUSH) 0.9 %
10 SYRINGE (ML) INJECTION
Status: COMPLETED | OUTPATIENT
Start: 2025-05-08 | End: 2025-05-08

## 2025-05-08 RX ORDER — IOPAMIDOL 755 MG/ML
75 INJECTION, SOLUTION INTRAVASCULAR
Status: COMPLETED | OUTPATIENT
Start: 2025-05-08 | End: 2025-05-08

## 2025-05-08 RX ADMIN — IOPAMIDOL 75 ML: 755 INJECTION, SOLUTION INTRAVENOUS at 07:17

## 2025-05-08 RX ADMIN — Medication 10 ML: at 07:17

## 2025-05-08 NOTE — PROGRESS NOTES
CTA personally reviewed    R ICA ~ 60% stenosis  L ICA ~ 90% stenosis - starts a little past bifurcation and extending ~ 2.5 cm past bifurcation     Pt has option for CEA or TCAR    Considering lesion more distal would lean towards doing TCAR  Will discuss with pt in office    Will schedule for procedure    Abdi Ramirez MD

## 2025-05-12 ENCOUNTER — TELEPHONE (OUTPATIENT)
Dept: VASCULAR SURGERY | Age: 60
End: 2025-05-12

## 2025-05-12 ENCOUNTER — PREP FOR PROCEDURE (OUTPATIENT)
Dept: VASCULAR SURGERY | Age: 60
End: 2025-05-12

## 2025-05-12 DIAGNOSIS — I65.22 CAROTID STENOSIS, LEFT: ICD-10-CM

## 2025-05-12 NOTE — TELEPHONE ENCOUNTER
Spoke with the pt, scheduled (L) TCAR 5/30/25 with Dr. Ramirez; pt already on Plavix, baby asa and statin.

## 2025-05-19 ENCOUNTER — OFFICE VISIT (OUTPATIENT)
Dept: VASCULAR SURGERY | Age: 60
End: 2025-05-19
Payer: MEDICARE

## 2025-05-19 DIAGNOSIS — I77.1 STENOSIS OF LEFT SUBCLAVIAN ARTERY: ICD-10-CM

## 2025-05-19 DIAGNOSIS — I65.23 ASYMPTOMATIC BILATERAL CAROTID ARTERY STENOSIS: Primary | ICD-10-CM

## 2025-05-19 DIAGNOSIS — I65.02 STENOSIS OF LEFT VERTEBRAL ARTERY: ICD-10-CM

## 2025-05-19 DIAGNOSIS — I73.9 PVD (PERIPHERAL VASCULAR DISEASE) WITH CLAUDICATION: ICD-10-CM

## 2025-05-19 PROCEDURE — 99215 OFFICE O/P EST HI 40 MIN: CPT | Performed by: SURGERY

## 2025-05-19 NOTE — PROGRESS NOTES
Vascular Surgery Outpatient Followup    PCP : Jed Umanzor MD   Urologist: Dr. TATI Tang  Podiatirst : Dr. Cleaning    9/21/21 Left internal iliac artery plasty  Left common femoral artery plasty         HISTORY OF PRESENT ILLNESS:    This is a 60 y.o. male who presents in fu regarding pvd and carotid disease.    He is able to walk about 2 block before having to stop because of his bilateral hip, pain which starts at hip and radiates down the thigh.  Resting makes it better.  7/10 pain.  The pain is crampy, aching, sore.      His previously present right calf claudication doesn't seem to be bothering him.  Denies LE rest pain or ulceration.    He denies any new symptoms of stroke, tia, localized weakness, slurred speech, or amaurosis fugax. 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 MRI was negative for an acute stroke.      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  He feels his dizziness has increased.  He denies any L UE claudication, rest pain, or open wounds.    Patient chronically has erectile dysfunction.  He has been dealing with this for many years, and states he has tried Cialis and Viagra without success.  He had penile pump placed which is working well.      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  Denies significant pain associated with this.       He is smoking about a 1 pk/day.      Past Medical History:        Diagnosis Date    Carotid stenosis     Cerebral artery occlusion with cerebral infarction (HCC)     Diabetes mellitus (HCC)     Emphysema     Hyperlipidemia     Hypertension     PVD (peripheral vascular disease) with claudication 2/11/2019     Past Surgical History:        Procedure Laterality Date    COLONOSCOPY      DENTAL SURGERY      TONSILLECTOMY      VASECTOMY       Current Medications:   Current Outpatient

## 2025-05-21 NOTE — PROGRESS NOTES
[facility]   PRE-ADMISSION TESTING GENERAL INSTRUCTIONS  PAT Phone Number: [phone]      GENERAL INSTRUCTIONS:    [x] Antibacterial Soap Shower Night before AND the Morning of procedure.    [x] Do not wear makeup, lotions, powders, deodorant the morning of surgery.  [x] No solid food after midnight. You may have SIPS of clear liquids up until 2 hours before your arrival time to the hospital.   [x] You may brush your teeth, gargle, but do not swallow water.   [x] No tobacco products, illegal drugs, or alcohol within 24 hours of your surgery.  [x] Jewelry or valuables should not be brought to the hospital. All body and/or tongue piercing's must be removed prior to arriving to hospital. No contact lens or hair pins.   [x] Arrange transportation with a responsible adult  to and from the hospital. Arrange for someone to be with you for the remainder of the day and for 24 hours after your procedure due to having had anesthesia.          -Who will be your  for transportation? friend    [x] Bring insurance card and photo ID.       PARKING INSTRUCTIONS:     [x] ARRIVAL DATE & TIME: 5/30 5:30 am  [x] Times are subject to change. We will contact you the business day before surgery if that were to occur.  [x] Enter into the [facility] Entrance. Two adults may accompany you. Masks are not required.  [x] Parking Lot \"I\" is where you will park. It is located on the corner of [address] and [address]. The entrance is on [address].   Only one vehicle - per patient, is permitted in parking lot.   Upon entering the parking lot, a voucher ticket will print.    EDUCATION INSTRUCTIONS:           [x] [facility] Pamphlet placed in chart.    [x] Fluoroscopy-Xray used in surgery reviewed with patient. Educational pamphlet placed in chart.  [x] Pain: Post-op pain is normal and to be expected. You will be asked to rate your pain from 0-10.    MEDICATION

## 2025-05-27 ENCOUNTER — ANESTHESIA EVENT (OUTPATIENT)
Dept: OPERATING ROOM | Age: 60
End: 2025-05-27
Payer: MEDICARE

## 2025-05-30 ENCOUNTER — HOSPITAL ENCOUNTER (INPATIENT)
Age: 60
LOS: 1 days | Discharge: HOME | DRG: 036 | End: 2025-05-31
Attending: SURGERY | Admitting: SURGERY
Payer: MEDICARE

## 2025-05-30 ENCOUNTER — ANESTHESIA (OUTPATIENT)
Dept: OPERATING ROOM | Age: 60
End: 2025-05-30
Payer: MEDICARE

## 2025-05-30 PROBLEM — I65.22 CAROTID ARTERY STENOSIS, ASYMPTOMATIC, LEFT: Status: ACTIVE | Noted: 2025-05-30

## 2025-05-30 LAB
ABO + RH BLD: NORMAL
ANION GAP SERPL CALCULATED.3IONS-SCNC: 12 MMOL/L (ref 7–16)
ARM BAND NUMBER: NORMAL
BLOOD BANK SAMPLE EXPIRATION: NORMAL
BLOOD GROUP ANTIBODIES SERPL: NEGATIVE
BUN SERPL-MCNC: 14 MG/DL (ref 6–20)
CALCIUM SERPL-MCNC: 9.3 MG/DL (ref 8.6–10)
CHLORIDE SERPL-SCNC: 105 MMOL/L (ref 98–107)
CO2 SERPL-SCNC: 23 MMOL/L (ref 22–29)
CREAT SERPL-MCNC: 0.6 MG/DL (ref 0.7–1.2)
ERYTHROCYTE [DISTWIDTH] IN BLOOD BY AUTOMATED COUNT: 12.4 % (ref 11.5–15)
GFR, ESTIMATED: >90 ML/MIN/1.73M2
GLUCOSE BLD-MCNC: 115 MG/DL (ref 74–99)
GLUCOSE BLD-MCNC: 161 MG/DL (ref 74–99)
GLUCOSE BLD-MCNC: 274 MG/DL (ref 74–99)
GLUCOSE SERPL-MCNC: 120 MG/DL (ref 74–99)
HCT VFR BLD AUTO: 46.5 % (ref 37–54)
HGB BLD-MCNC: 16.3 G/DL (ref 12.5–16.5)
INR PPP: 1.1
MCH RBC QN AUTO: 35.4 PG (ref 26–35)
MCHC RBC AUTO-ENTMCNC: 35.1 G/DL (ref 32–34.5)
MCV RBC AUTO: 101.1 FL (ref 80–99.9)
PARTIAL THROMBOPLASTIN TIME: 31.6 SEC (ref 24.5–35.1)
PLATELET # BLD AUTO: 145 K/UL (ref 130–450)
PMV BLD AUTO: 9.8 FL (ref 7–12)
POTASSIUM SERPL-SCNC: 3.9 MMOL/L (ref 3.5–5.1)
PROTHROMBIN TIME: 12.3 SEC (ref 9.3–12.4)
RBC # BLD AUTO: 4.6 M/UL (ref 3.8–5.8)
SODIUM SERPL-SCNC: 140 MMOL/L (ref 136–145)
WBC OTHER # BLD: 9.7 K/UL (ref 4.5–11.5)

## 2025-05-30 PROCEDURE — 3700000001 HC ADD 15 MINUTES (ANESTHESIA): Performed by: SURGERY

## 2025-05-30 PROCEDURE — 6360000002 HC RX W HCPCS: Performed by: SURGERY

## 2025-05-30 PROCEDURE — 3600000017 HC SURGERY HYBRID ADDL 15MIN: Performed by: SURGERY

## 2025-05-30 PROCEDURE — 85027 COMPLETE CBC AUTOMATED: CPT

## 2025-05-30 PROCEDURE — 3700000000 HC ANESTHESIA ATTENDED CARE: Performed by: SURGERY

## 2025-05-30 PROCEDURE — 037L3DZ DILATION OF LEFT INTERNAL CAROTID ARTERY WITH INTRALUMINAL DEVICE, PERCUTANEOUS APPROACH: ICD-10-PCS | Performed by: SURGERY

## 2025-05-30 PROCEDURE — 6360000002 HC RX W HCPCS: Performed by: NURSE PRACTITIONER

## 2025-05-30 PROCEDURE — 2500000003 HC RX 250 WO HCPCS

## 2025-05-30 PROCEDURE — 2000000000 HC ICU R&B

## 2025-05-30 PROCEDURE — 7100000000 HC PACU RECOVERY - FIRST 15 MIN: Performed by: SURGERY

## 2025-05-30 PROCEDURE — 86850 RBC ANTIBODY SCREEN: CPT

## 2025-05-30 PROCEDURE — 86901 BLOOD TYPING SEROLOGIC RH(D): CPT

## 2025-05-30 PROCEDURE — 6360000002 HC RX W HCPCS

## 2025-05-30 PROCEDURE — 2700000000 HC OXYGEN THERAPY PER DAY

## 2025-05-30 PROCEDURE — 86900 BLOOD TYPING SEROLOGIC ABO: CPT

## 2025-05-30 PROCEDURE — 2709999900 HC NON-CHARGEABLE SUPPLY: Performed by: SURGERY

## 2025-05-30 PROCEDURE — 2500000003 HC RX 250 WO HCPCS: Performed by: SURGERY

## 2025-05-30 PROCEDURE — 2500000003 HC RX 250 WO HCPCS: Performed by: NURSE PRACTITIONER

## 2025-05-30 PROCEDURE — 2580000003 HC RX 258

## 2025-05-30 PROCEDURE — 7100000001 HC PACU RECOVERY - ADDTL 15 MIN: Performed by: SURGERY

## 2025-05-30 PROCEDURE — 2580000003 HC RX 258: Performed by: NURSE PRACTITIONER

## 2025-05-30 PROCEDURE — 6360000004 HC RX CONTRAST MEDICATION: Performed by: SURGERY

## 2025-05-30 PROCEDURE — 6370000000 HC RX 637 (ALT 250 FOR IP): Performed by: NURSE PRACTITIONER

## 2025-05-30 PROCEDURE — C1769 GUIDE WIRE: HCPCS | Performed by: SURGERY

## 2025-05-30 PROCEDURE — 85730 THROMBOPLASTIN TIME PARTIAL: CPT

## 2025-05-30 PROCEDURE — 3600000007 HC SURGERY HYBRID BASE: Performed by: SURGERY

## 2025-05-30 PROCEDURE — 03HY32Z INSERTION OF MONITORING DEVICE INTO UPPER ARTERY, PERCUTANEOUS APPROACH: ICD-10-PCS | Performed by: SURGERY

## 2025-05-30 PROCEDURE — 85347 COAGULATION TIME ACTIVATED: CPT

## 2025-05-30 PROCEDURE — 85610 PROTHROMBIN TIME: CPT

## 2025-05-30 PROCEDURE — 6360000002 HC RX W HCPCS: Performed by: STUDENT IN AN ORGANIZED HEALTH CARE EDUCATION/TRAINING PROGRAM

## 2025-05-30 PROCEDURE — 82962 GLUCOSE BLOOD TEST: CPT

## 2025-05-30 PROCEDURE — 037J3DZ DILATION OF LEFT COMMON CAROTID ARTERY WITH INTRALUMINAL DEVICE, PERCUTANEOUS APPROACH: ICD-10-PCS | Performed by: SURGERY

## 2025-05-30 PROCEDURE — 6370000000 HC RX 637 (ALT 250 FOR IP): Performed by: SURGERY

## 2025-05-30 PROCEDURE — C1876 STENT, NON-COA/NON-COV W/DEL: HCPCS | Performed by: SURGERY

## 2025-05-30 PROCEDURE — 80048 BASIC METABOLIC PNL TOTAL CA: CPT

## 2025-05-30 PROCEDURE — C1894 INTRO/SHEATH, NON-LASER: HCPCS | Performed by: SURGERY

## 2025-05-30 PROCEDURE — C1725 CATH, TRANSLUMIN NON-LASER: HCPCS | Performed by: SURGERY

## 2025-05-30 DEVICE — 10-8MM X 40MM
Type: IMPLANTABLE DEVICE | Site: CAROTID | Status: FUNCTIONAL
Brand: ENROUTE TRANSCAROTID STENT

## 2025-05-30 RX ORDER — ONDANSETRON 4 MG/1
4 TABLET, ORALLY DISINTEGRATING ORAL EVERY 8 HOURS PRN
Status: DISCONTINUED | OUTPATIENT
Start: 2025-05-30 | End: 2025-05-31 | Stop reason: HOSPADM

## 2025-05-30 RX ORDER — SODIUM CHLORIDE 9 MG/ML
INJECTION, SOLUTION INTRAVENOUS PRN
Status: DISCONTINUED | OUTPATIENT
Start: 2025-05-30 | End: 2025-05-31 | Stop reason: HOSPADM

## 2025-05-30 RX ORDER — PROTAMINE SULFATE 10 MG/ML
INJECTION, SOLUTION INTRAVENOUS
Status: DISCONTINUED | OUTPATIENT
Start: 2025-05-30 | End: 2025-05-30 | Stop reason: SDUPTHER

## 2025-05-30 RX ORDER — SODIUM CHLORIDE 0.9 % (FLUSH) 0.9 %
5-40 SYRINGE (ML) INJECTION EVERY 12 HOURS SCHEDULED
Status: DISCONTINUED | OUTPATIENT
Start: 2025-05-30 | End: 2025-05-30 | Stop reason: HOSPADM

## 2025-05-30 RX ORDER — SODIUM CHLORIDE 0.9 % (FLUSH) 0.9 %
5-40 SYRINGE (ML) INJECTION EVERY 12 HOURS SCHEDULED
Status: DISCONTINUED | OUTPATIENT
Start: 2025-05-30 | End: 2025-05-31 | Stop reason: HOSPADM

## 2025-05-30 RX ORDER — SODIUM CHLORIDE 9 MG/ML
INJECTION, SOLUTION INTRAVENOUS PRN
Status: DISCONTINUED | OUTPATIENT
Start: 2025-05-30 | End: 2025-05-30 | Stop reason: HOSPADM

## 2025-05-30 RX ORDER — DEXAMETHASONE SODIUM PHOSPHATE 10 MG/ML
INJECTION, SOLUTION INTRA-ARTICULAR; INTRALESIONAL; INTRAMUSCULAR; INTRAVENOUS; SOFT TISSUE
Status: DISCONTINUED | OUTPATIENT
Start: 2025-05-30 | End: 2025-05-30 | Stop reason: SDUPTHER

## 2025-05-30 RX ORDER — LIDOCAINE HYDROCHLORIDE 10 MG/ML
INJECTION, SOLUTION INFILTRATION; PERINEURAL
Status: DISCONTINUED
Start: 2025-05-30 | End: 2025-05-30

## 2025-05-30 RX ORDER — SODIUM CHLORIDE 0.9 % (FLUSH) 0.9 %
5-40 SYRINGE (ML) INJECTION PRN
Status: DISCONTINUED | OUTPATIENT
Start: 2025-05-30 | End: 2025-05-31 | Stop reason: HOSPADM

## 2025-05-30 RX ORDER — LISINOPRIL 2.5 MG/1
2.5 TABLET ORAL DAILY
Status: DISCONTINUED | OUTPATIENT
Start: 2025-05-31 | End: 2025-05-31 | Stop reason: HOSPADM

## 2025-05-30 RX ORDER — MEPERIDINE HYDROCHLORIDE 25 MG/ML
12.5 INJECTION INTRAMUSCULAR; INTRAVENOUS; SUBCUTANEOUS
Status: DISCONTINUED | OUTPATIENT
Start: 2025-05-30 | End: 2025-05-30 | Stop reason: HOSPADM

## 2025-05-30 RX ORDER — HYDROMORPHONE HYDROCHLORIDE 1 MG/ML
0.25 INJECTION, SOLUTION INTRAMUSCULAR; INTRAVENOUS; SUBCUTANEOUS EVERY 5 MIN PRN
Status: DISCONTINUED | OUTPATIENT
Start: 2025-05-30 | End: 2025-05-30 | Stop reason: HOSPADM

## 2025-05-30 RX ORDER — DIPHENHYDRAMINE HYDROCHLORIDE 50 MG/ML
12.5 INJECTION, SOLUTION INTRAMUSCULAR; INTRAVENOUS
Status: DISCONTINUED | OUTPATIENT
Start: 2025-05-30 | End: 2025-05-30 | Stop reason: HOSPADM

## 2025-05-30 RX ORDER — OXYCODONE HYDROCHLORIDE 5 MG/1
5 TABLET ORAL EVERY 4 HOURS PRN
Status: DISCONTINUED | OUTPATIENT
Start: 2025-05-30 | End: 2025-05-31 | Stop reason: HOSPADM

## 2025-05-30 RX ORDER — ATORVASTATIN CALCIUM 10 MG/1
10 TABLET, FILM COATED ORAL NIGHTLY
Status: DISCONTINUED | OUTPATIENT
Start: 2025-05-31 | End: 2025-05-31 | Stop reason: HOSPADM

## 2025-05-30 RX ORDER — ALBUTEROL SULFATE 90 UG/1
2 INHALANT RESPIRATORY (INHALATION) EVERY 6 HOURS PRN
Status: DISCONTINUED | OUTPATIENT
Start: 2025-05-30 | End: 2025-05-30 | Stop reason: CLARIF

## 2025-05-30 RX ORDER — SODIUM CHLORIDE 9 MG/ML
INJECTION, SOLUTION INTRAVENOUS CONTINUOUS
Status: DISCONTINUED | OUTPATIENT
Start: 2025-05-30 | End: 2025-05-31 | Stop reason: HOSPADM

## 2025-05-30 RX ORDER — HEPARIN SODIUM 1000 [USP'U]/ML
INJECTION, SOLUTION INTRAVENOUS; SUBCUTANEOUS
Status: DISCONTINUED | OUTPATIENT
Start: 2025-05-30 | End: 2025-05-30 | Stop reason: SDUPTHER

## 2025-05-30 RX ORDER — PROPOFOL 10 MG/ML
INJECTION, EMULSION INTRAVENOUS
Status: DISCONTINUED | OUTPATIENT
Start: 2025-05-30 | End: 2025-05-30 | Stop reason: SDUPTHER

## 2025-05-30 RX ORDER — ALBUTEROL SULFATE 0.83 MG/ML
2.5 SOLUTION RESPIRATORY (INHALATION) EVERY 6 HOURS PRN
Status: DISCONTINUED | OUTPATIENT
Start: 2025-05-30 | End: 2025-05-31 | Stop reason: HOSPADM

## 2025-05-30 RX ORDER — MIDAZOLAM HYDROCHLORIDE 1 MG/ML
INJECTION, SOLUTION INTRAMUSCULAR; INTRAVENOUS
Status: DISCONTINUED | OUTPATIENT
Start: 2025-05-30 | End: 2025-05-30 | Stop reason: SDUPTHER

## 2025-05-30 RX ORDER — GLUCAGON 1 MG/ML
1 KIT INJECTION PRN
Status: DISCONTINUED | OUTPATIENT
Start: 2025-05-30 | End: 2025-05-31 | Stop reason: HOSPADM

## 2025-05-30 RX ORDER — SODIUM CHLORIDE 0.9 % (FLUSH) 0.9 %
5-40 SYRINGE (ML) INJECTION PRN
Status: DISCONTINUED | OUTPATIENT
Start: 2025-05-30 | End: 2025-05-30 | Stop reason: HOSPADM

## 2025-05-30 RX ORDER — ASPIRIN 81 MG/1
81 TABLET, CHEWABLE ORAL DAILY
Status: DISCONTINUED | OUTPATIENT
Start: 2025-05-31 | End: 2025-05-31 | Stop reason: HOSPADM

## 2025-05-30 RX ORDER — VENLAFAXINE HYDROCHLORIDE 75 MG/1
75 CAPSULE, EXTENDED RELEASE ORAL
Status: DISCONTINUED | OUTPATIENT
Start: 2025-05-31 | End: 2025-05-31 | Stop reason: HOSPADM

## 2025-05-30 RX ORDER — ONDANSETRON 2 MG/ML
4 INJECTION INTRAMUSCULAR; INTRAVENOUS
Status: DISCONTINUED | OUTPATIENT
Start: 2025-05-30 | End: 2025-05-30 | Stop reason: HOSPADM

## 2025-05-30 RX ORDER — IPRATROPIUM BROMIDE AND ALBUTEROL SULFATE 2.5; .5 MG/3ML; MG/3ML
1 SOLUTION RESPIRATORY (INHALATION)
Status: DISCONTINUED | OUTPATIENT
Start: 2025-05-30 | End: 2025-05-30 | Stop reason: HOSPADM

## 2025-05-30 RX ORDER — PROCHLORPERAZINE EDISYLATE 5 MG/ML
5 INJECTION INTRAMUSCULAR; INTRAVENOUS
Status: DISCONTINUED | OUTPATIENT
Start: 2025-05-30 | End: 2025-05-30 | Stop reason: HOSPADM

## 2025-05-30 RX ORDER — GABAPENTIN 300 MG/1
300 CAPSULE ORAL EVERY MORNING
Status: DISCONTINUED | OUTPATIENT
Start: 2025-05-31 | End: 2025-05-31 | Stop reason: HOSPADM

## 2025-05-30 RX ORDER — SODIUM CHLORIDE 9 MG/ML
INJECTION, SOLUTION INTRAVENOUS CONTINUOUS
Status: DISCONTINUED | OUTPATIENT
Start: 2025-05-30 | End: 2025-05-30 | Stop reason: HOSPADM

## 2025-05-30 RX ORDER — DEXTROSE MONOHYDRATE 100 MG/ML
INJECTION, SOLUTION INTRAVENOUS CONTINUOUS PRN
Status: DISCONTINUED | OUTPATIENT
Start: 2025-05-30 | End: 2025-05-31 | Stop reason: HOSPADM

## 2025-05-30 RX ORDER — METOPROLOL SUCCINATE 50 MG/1
100 TABLET, EXTENDED RELEASE ORAL DAILY
Status: DISCONTINUED | OUTPATIENT
Start: 2025-05-31 | End: 2025-05-31 | Stop reason: HOSPADM

## 2025-05-30 RX ORDER — SODIUM CHLORIDE 9 MG/ML
INJECTION, SOLUTION INTRAVENOUS
Status: DISCONTINUED | OUTPATIENT
Start: 2025-05-30 | End: 2025-05-30

## 2025-05-30 RX ORDER — SODIUM CHLORIDE, SODIUM LACTATE, POTASSIUM CHLORIDE, CALCIUM CHLORIDE 600; 310; 30; 20 MG/100ML; MG/100ML; MG/100ML; MG/100ML
INJECTION, SOLUTION INTRAVENOUS
Status: DISCONTINUED | OUTPATIENT
Start: 2025-05-30 | End: 2025-05-30 | Stop reason: SDUPTHER

## 2025-05-30 RX ORDER — HYDROMORPHONE HYDROCHLORIDE 1 MG/ML
0.5 INJECTION, SOLUTION INTRAMUSCULAR; INTRAVENOUS; SUBCUTANEOUS EVERY 5 MIN PRN
Status: DISCONTINUED | OUTPATIENT
Start: 2025-05-30 | End: 2025-05-30 | Stop reason: HOSPADM

## 2025-05-30 RX ORDER — GLYCOPYRROLATE 0.2 MG/ML
INJECTION INTRAMUSCULAR; INTRAVENOUS
Status: DISCONTINUED | OUTPATIENT
Start: 2025-05-30 | End: 2025-05-30 | Stop reason: SDUPTHER

## 2025-05-30 RX ORDER — LABETALOL HYDROCHLORIDE 5 MG/ML
5 INJECTION, SOLUTION INTRAVENOUS
Status: DISCONTINUED | OUTPATIENT
Start: 2025-05-30 | End: 2025-05-30 | Stop reason: HOSPADM

## 2025-05-30 RX ORDER — ONDANSETRON 2 MG/ML
4 INJECTION INTRAMUSCULAR; INTRAVENOUS EVERY 6 HOURS PRN
Status: DISCONTINUED | OUTPATIENT
Start: 2025-05-30 | End: 2025-05-31 | Stop reason: HOSPADM

## 2025-05-30 RX ORDER — NALOXONE HYDROCHLORIDE 0.4 MG/ML
INJECTION, SOLUTION INTRAMUSCULAR; INTRAVENOUS; SUBCUTANEOUS PRN
Status: DISCONTINUED | OUTPATIENT
Start: 2025-05-30 | End: 2025-05-30 | Stop reason: HOSPADM

## 2025-05-30 RX ORDER — INSULIN LISPRO 100 [IU]/ML
0-4 INJECTION, SOLUTION INTRAVENOUS; SUBCUTANEOUS
Status: DISCONTINUED | OUTPATIENT
Start: 2025-05-30 | End: 2025-05-31 | Stop reason: HOSPADM

## 2025-05-30 RX ORDER — ONDANSETRON 2 MG/ML
INJECTION INTRAMUSCULAR; INTRAVENOUS
Status: DISCONTINUED | OUTPATIENT
Start: 2025-05-30 | End: 2025-05-30 | Stop reason: SDUPTHER

## 2025-05-30 RX ORDER — IOPAMIDOL 612 MG/ML
INJECTION, SOLUTION INTRAVASCULAR PRN
Status: DISCONTINUED | OUTPATIENT
Start: 2025-05-30 | End: 2025-05-30 | Stop reason: ALTCHOICE

## 2025-05-30 RX ORDER — CLOPIDOGREL BISULFATE 75 MG/1
75 TABLET ORAL DAILY
Status: DISCONTINUED | OUTPATIENT
Start: 2025-05-31 | End: 2025-05-31 | Stop reason: HOSPADM

## 2025-05-30 RX ORDER — CLOPIDOGREL BISULFATE 75 MG/1
75 TABLET ORAL ONCE
Status: COMPLETED | OUTPATIENT
Start: 2025-05-30 | End: 2025-05-30

## 2025-05-30 RX ORDER — EPHEDRINE SULFATE/0.9% NACL/PF 25 MG/5 ML
SYRINGE (ML) INTRAVENOUS
Status: DISCONTINUED | OUTPATIENT
Start: 2025-05-30 | End: 2025-05-30 | Stop reason: SDUPTHER

## 2025-05-30 RX ORDER — PHENYLEPHRINE HCL IN 0.9% NACL 1 MG/10 ML
SYRINGE (ML) INTRAVENOUS
Status: DISCONTINUED | OUTPATIENT
Start: 2025-05-30 | End: 2025-05-30 | Stop reason: SDUPTHER

## 2025-05-30 RX ADMIN — MIDAZOLAM 2 MG: 1 INJECTION INTRAMUSCULAR; INTRAVENOUS at 06:44

## 2025-05-30 RX ADMIN — PROPOFOL 50 MCG/KG/MIN: 10 INJECTION, EMULSION INTRAVENOUS at 07:31

## 2025-05-30 RX ADMIN — CLOPIDOGREL BISULFATE 75 MG: 75 TABLET ORAL at 06:14

## 2025-05-30 RX ADMIN — ONDANSETRON 4 MG: 2 INJECTION, SOLUTION INTRAMUSCULAR; INTRAVENOUS at 09:03

## 2025-05-30 RX ADMIN — Medication 200 MCG: at 07:57

## 2025-05-30 RX ADMIN — SODIUM CHLORIDE: 0.9 INJECTION, SOLUTION INTRAVENOUS at 19:26

## 2025-05-30 RX ADMIN — INSULIN LISPRO 2 UNITS: 100 INJECTION, SOLUTION INTRAVENOUS; SUBCUTANEOUS at 20:06

## 2025-05-30 RX ADMIN — SODIUM CHLORIDE, PRESERVATIVE FREE 10 ML: 5 INJECTION INTRAVENOUS at 20:06

## 2025-05-30 RX ADMIN — GLYCOPYRROLATE 0.2 MG: 0.2 INJECTION INTRAMUSCULAR; INTRAVENOUS at 07:28

## 2025-05-30 RX ADMIN — HEPARIN SODIUM 10000 UNITS: 1000 INJECTION INTRAVENOUS; SUBCUTANEOUS at 08:31

## 2025-05-30 RX ADMIN — PROTAMINE SULFATE 50 MG: 10 INJECTION, SOLUTION INTRAVENOUS at 08:55

## 2025-05-30 RX ADMIN — SODIUM CHLORIDE, POTASSIUM CHLORIDE, SODIUM LACTATE AND CALCIUM CHLORIDE: 600; 310; 30; 20 INJECTION, SOLUTION INTRAVENOUS at 07:28

## 2025-05-30 RX ADMIN — Medication 100 MCG: at 07:59

## 2025-05-30 RX ADMIN — WATER 2000 MG: 1 INJECTION INTRAMUSCULAR; INTRAVENOUS; SUBCUTANEOUS at 19:29

## 2025-05-30 RX ADMIN — EPHEDRINE SULFATE 5 MG: 5 INJECTION INTRAVENOUS at 07:40

## 2025-05-30 RX ADMIN — REMIFENTANIL HYDROCHLORIDE 0.1 MCG/KG/MIN: 1 INJECTION, POWDER, LYOPHILIZED, FOR SOLUTION INTRAVENOUS at 07:31

## 2025-05-30 RX ADMIN — DEXAMETHASONE SODIUM PHOSPHATE 10 MG: 10 INJECTION INTRAMUSCULAR; INTRAVENOUS at 07:57

## 2025-05-30 RX ADMIN — SODIUM CHLORIDE, POTASSIUM CHLORIDE, SODIUM LACTATE AND CALCIUM CHLORIDE: 600; 310; 30; 20 INJECTION, SOLUTION INTRAVENOUS at 08:58

## 2025-05-30 RX ADMIN — PHENYLEPHRINE HYDROCHLORIDE 100 MCG/MIN: 10 INJECTION INTRAVENOUS at 08:13

## 2025-05-30 RX ADMIN — Medication 100 MCG: at 07:48

## 2025-05-30 RX ADMIN — EPHEDRINE SULFATE 5 MG: 5 INJECTION INTRAVENOUS at 08:43

## 2025-05-30 RX ADMIN — Medication 100 MCG: at 07:52

## 2025-05-30 RX ADMIN — CEFAZOLIN 2000 MG: 1 INJECTION, POWDER, FOR SOLUTION INTRAMUSCULAR; INTRAVENOUS at 07:41

## 2025-05-30 RX ADMIN — Medication 100 MCG: at 08:42

## 2025-05-30 RX ADMIN — EPHEDRINE SULFATE 5 MG: 5 INJECTION INTRAVENOUS at 08:46

## 2025-05-30 RX ADMIN — HYDROMORPHONE HYDROCHLORIDE 0.5 MG: 1 INJECTION, SOLUTION INTRAMUSCULAR; INTRAVENOUS; SUBCUTANEOUS at 10:09

## 2025-05-30 RX ADMIN — SODIUM CHLORIDE: 0.9 INJECTION, SOLUTION INTRAVENOUS at 06:05

## 2025-05-30 RX ADMIN — Medication 100 MCG: at 08:37

## 2025-05-30 ASSESSMENT — PAIN DESCRIPTION - LOCATION: LOCATION: GROIN;NECK

## 2025-05-30 ASSESSMENT — PAIN DESCRIPTION - DESCRIPTORS: DESCRIPTORS: ACHING;DISCOMFORT

## 2025-05-30 ASSESSMENT — PAIN SCALES - GENERAL
PAINLEVEL_OUTOF10: 0
PAINLEVEL_OUTOF10: 8

## 2025-05-30 ASSESSMENT — PAIN - FUNCTIONAL ASSESSMENT: PAIN_FUNCTIONAL_ASSESSMENT: 0-10

## 2025-05-30 NOTE — ANESTHESIA PRE PROCEDURE
Department of Anesthesiology  Preprocedure Note       Name:  Kyle Cheng   Age:  60 y.o.  :  1965                                          MRN:  [id number]         Date:  2025      Surgeon: Surgeon(s):  Abdi Ramirez MD    Procedure: Procedure(s):  TRANSCATHETER PLACEMENT OF INTRAVASCULAR STENT LEFT CAROTID ARTERY    Medications prior to admission:   Prior to Admission medications    Medication Sig Start Date End Date Taking? Authorizing Provider   HUMALOG KWIKPEN 100 UNIT/ML SOPN Sliding scale per instructions by prescribeer 25  Yes Jake Johnson MD   lisinopril (PRINIVIL;ZESTRIL) 2.5 MG tablet Take 1 tablet by mouth daily   Yes Jake Johnson MD   sAXagliptin (ONGLYZA) 5 MG TABS tablet Take 1 tablet by mouth daily 24  Yes Jake Johnson MD   venlafaxine (EFFEXOR XR) 75 MG extended release capsule TAKE 1 CAPSULE BY MOUTH EVERY DAY WITH FOOD SWALLOW WHOLE 4/3/25  Yes ProviderJake MD   metoprolol succinate (TOPROL XL) 25 MG extended release tablet Take 4 tablets by mouth every morning   Yes ProviderJake MD   albuterol sulfate HFA (PROVENTIL;VENTOLIN;PROAIR) 108 (90 Base) MCG/ACT inhaler Inhale 2 puffs into the lungs every 6 hours as needed for Wheezing   Yes ProviderJake MD   gabapentin (NEURONTIN) 300 MG capsule Take 1 capsule by mouth every morning. 21  Yes Jake Johnson MD   atorvastatin (LIPITOR) 10 MG tablet Take 1 tablet by mouth nightly 20  Yes Migue De La Rosa MD   clopidogrel (PLAVIX) 75 MG tablet Take 1 tablet by mouth daily  Patient taking differently: Take 1 tablet by mouth every morning 20  Yes Migue De La Rosa MD   TOUJEO SOLOSTAR 300 UNIT/ML injection pen INJECT 45 UNITS SUBCUTANEOUSLY ONCE A DAY  Patient taking differently: 65 Units nightly 19  Yes Jake Johnson MD   aspirin 81 MG chewable tablet Take 1 tablet by mouth daily 6/8/15  Yes Kristen Panda MD   metFORMIN (GLUCOPHAGE) 500 MG

## 2025-05-30 NOTE — PROGRESS NOTES
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  No wheezes, no accessory muscle use noted   Cardiovascular: RRR, no heaves or thrills palpated   Telemetry: SR  Abdomen: Soft, nontender  Neurologic/Psych: Alert and orientedx3, following commands, equal in strength 5/5 bilaterally   Skin: Warm and dry    Incision: right groin soft, neck incision soft well approximated       Assessment/Plan: Day of Surgery     1. Carotid stenosis S/p Left TCAR   - Frequent neurovascular checks, monitor incisions for signs of swelling/hematoma   - DAPT  - NPO, IVF @75cc/hr  - Bedrest  - Perioperative Ancef  - prn pain control       Electronically signed by CASSIDY Pineda - CNP on 5/30/2025 at 12:05 PM

## 2025-05-30 NOTE — H&P
Vascular Surgery History & Physical Exam    Chief Complaint: Hx Carotid disease    HISTORY OF PRESENT ILLNESS:    The patient is a 60 y.o. male who presents to the hospital for elective left TCAR.  There is not a hx of CVA/TIA in the recent past.      ROS : All others Negative if blank [], Positive if [x]  General   [] Fevers   [] Chills   [] Weight Loss   Skin   [] Tissue Loss   Eyes   [x] Wears Glasses/Contacts   [] Vision Changes   Respiratory    [] Shortness of breath   Cardiovascular   [] Chest Pain   [] Shortness of breath with exertion   Gastrointestinal   [] Abdominal Pain     Past Medical History:   Diagnosis Date    Carotid stenosis     Cerebral artery occlusion with cerebral infarction (HCC)     Diabetes mellitus (HCC)     Emphysema     History of penile implant     approx 2023    Hyperlipidemia     Hypertension     PVD (peripheral vascular disease) with claudication 02/11/2019     Past Surgical History:   Procedure Laterality Date    COLONOSCOPY      DENTAL SURGERY      TONSILLECTOMY      VASECTOMY       Current Medications:     Current Facility-Administered Medications:     0.9 % sodium chloride infusion, , IntraVENous, Continuous, Herman Mcdonald APRN - CNP, Last Rate: 50 mL/hr at 05/30/25 0634, NoRateChange at 05/30/25 0634    sodium chloride flush 0.9 % injection 5-40 mL, 5-40 mL, IntraVENous, 2 times per day, Herman Mcdonald APRN - CNP    sodium chloride flush 0.9 % injection 5-40 mL, 5-40 mL, IntraVENous, PRN, Herman Mcdonald APRN - CNP    0.9 % sodium chloride infusion, , IntraVENous, PRN, Herman Mcdonald APRN - CNP    ceFAZolin (ANCEF) 2,000 mg in sterile water 20 mL IV syringe, 2,000 mg, IntraVENous, See Admin Instructions, Herman Mcdonald APRN - CNP    lidocaine 1 % injection, , , ,   Allergies:  Patient has no known allergies.  Social History     Socioeconomic History    Marital status:      Spouse name: Not on file    Number of children: Not on file    Years of education: Not on file

## 2025-05-30 NOTE — PROGRESS NOTES
Dr. Kiser called.   Patient had his plavix last night.    Ok to proceed.   In addition Dr. Kiser wants him to have a Plavix this morning.

## 2025-05-30 NOTE — ANESTHESIA PROCEDURE NOTES
Arterial Line:    An arterial line was placed using surface landmarks, in the OR for the following indication(s): continuous blood pressure monitoring and blood sampling needed.    A 20 gauge (size), 1 and 3/4 inch (length) (type) catheter was placed, Seldinger technique used, into the left radial artery, secured by tape and Tegaderm.  Anesthesia type: Local  Local infiltration: Injection    Events:  patient tolerated procedure well with no complications.5/30/2025 6:40 AM5/30/2025 6:45 AM  Anesthesiologist: Herman Mason DO  Performed: Anesthesiologist   Preanesthetic Checklist  Completed: patient identified, IV checked, site marked, risks and benefits discussed, surgical/procedural consents, equipment checked, pre-op evaluation, timeout performed, anesthesia consent given, oxygen available and monitors applied/VS acknowledged

## 2025-05-30 NOTE — OP NOTE
Operative Note      Patient: Kyle Cheng  YOB: 1965  MRN: [id number]    Date of Procedure: 5/30/2025    Pre-Op Diagnosis Codes:      * Carotid stenosis, left [I65.22] Assx > 90%    Post-Op Diagnosis: Same       Procedure(s):  Left transcarotid artery revascularization with 10-8x40 stent    Surgeon(s):  Abdi Ramirez MD Miladore, Julia N, MD    Assistant:   Surgical Assistant: Migue Terrazas  Resident: Shantanu Archibald DO    Anesthesia: Monitor Anesthesia Care    Estimated Blood Loss (mL): less than 50     Complications: None    Specimens:   * No specimens in log *    Implants:  Implant Name Type Inv. Item Serial No.  Lot No. LRB No. Used Action   STENT CAR L 40 MM DIA10-8 MM DEL SHTH L 57 CM TAE 6 FR ART - [id number] Carotid stents STENT CAR L 40 MM DIA10-8 MM DEL SHTH L 57 CM TAE 6 FR ART  [employer]WD [id number] Left 1 Implanted         Drains: * No LDAs found *    Findings:  Infection Present At Time Of Surgery (PATOS) (choose all levels that have infection present):  No infection present  Other Findings: Widely patent L ICA s/p TCAR    Findings: Lesion length 2.5 cm                  90 % stenosis                  minimal internal carotid artery tortuosity      DESCRIPTION OF PROCEDURE: The patient was identified and the procedure was confirmed. The left neck was prepped and draped in the usual sterile fashion.      The right femoral vein was accessed using cook needle under us guidance.  The wire and than 8 fr sheath was placed.  A skin incision was made above the clavicle in a vertical fashion.  It was carried down through the subcutaneous tissue.  The heads of the sternocleidomastoid were split and the common carotid artery was dissected free of surrounding tissues and controled proximally with a vessel loop double looped.       The patient was then heparinized, maintaining an activated clotting time greater than 300 seconds.      A u stitch with 5.0 prolene

## 2025-05-30 NOTE — DISCHARGE INSTRUCTIONS
Carotid Stenosis and Carotid Care After Surgery     Refer to this sheet in the next few weeks. These discharge instructions provide you with general information on caring for yourself after you leave the hospital. Your caregiver may also give you specific instructions. Your treatment has been planned according to the most current medical practices available, but unavoidable complications sometimes occur. If you have any problems or questions after discharge, please call your caregiver.     HOME CARE INSTRUCTIONS  Ø It is normal to be sore for a couple weeks after surgery. See your caregiver if this seems to be getting worse rather than better.  Ø Take showers, not baths, for a few days after surgery or until instructed otherwise by your caregiver. Do not take baths or swim until directed by your surgeon.  Ø Only take over-the-counter or prescription medicines for pain, discomfort, or fever as directed by your caregiver.  Ø A blood thinner (anticoagulant) may be prescribed after surgery. This medicine should be taken exactly as directed.  Ø Change bandages (dressings) as directed by your caregiver.  Ø Resume your normal activities as directed by your caregiver.  Ø Avoid lifting until you are instructed otherwise.  Ø Make an appointment to see your caregiver for stitches (suture) or staple removal when instructed.  Ø Stop smoking if you smoke. This is a grave risk factor.  Ø Stop taking the pill (oral contraceptives) unless your caregiver recommends otherwise.  Ø Maintain good blood pressure control.  Ø Exercise regularly or as instructed.  Ø Lower blood lipids (cholesterol and triglycerides).  Ø Eat a heart-healthy diet. Manage heart problems if they are contributing to your risk.     SEEK MEDICAL CARE IF:  Ø There is increased bleeding from the wound.  Ø You notice redness, swelling, or increasing pain in the wound.  Ø You notice swelling in your neck or have difficulty breathing or talking.  Ø You notice a bad

## 2025-05-30 NOTE — ANESTHESIA POSTPROCEDURE EVALUATION
Department of Anesthesiology  Postprocedure Note    Patient: Kyle Cheng  MRN: [id number]  YOB: 1965  Date of evaluation: 5/30/2025    Procedure Summary       Date: 05/30/25 Room / Location: [address]    Anesthesia Start: 0640 Anesthesia Stop: 0935    Procedure: Left transcarotid artery revascularization (Left: Neck) Diagnosis:       Carotid stenosis, left      (Carotid stenosis, left [I65.22])    Surgeons: Abdi Ramirez MD Responsible Provider: Herman Mason DO    Anesthesia Type: MAC ASA Status: 4            Anesthesia Type: MAC    Peg Phase I: Peg Score: 9    Peg Phase II:      Anesthesia Post Evaluation    Patient location during evaluation: PACU  Patient participation: complete - patient participated  Level of consciousness: awake  Cardiovascular status: blood pressure returned to baseline  Respiratory status: acceptable  Hydration status: euvolemic  Multimodal analgesia pain management approach  Pain management: adequate        No notable events documented.

## 2025-05-30 NOTE — OP NOTE
Operative Note      Patient: Kyle Cheng  YOB: 1965  MRN: [id number]    Date of Procedure: 5/30/2025    Pre-Op Diagnosis Codes:      * Carotid stenosis, left [I65.22]    Post-Op Diagnosis: Same       Procedure(s):  Left transcarotid artery revascularization    Surgeon(s):  Abdi Ramirez MD Miladore, Julia N, MD    Assistant:   Surgical Assistant: Migue Terrazas  Resident: Shantanu Archibald DO    Anesthesia: Monitor Anesthesia Care    Estimated Blood Loss (mL): Minimal    Complications: None    Specimens:   * No specimens in log *    Implants:  Implant Name Type Inv. Item Serial No.  Lot No. LRB No. Used Action   STENT CAR L 40 MM DIA10-8 MM DEL SHTH L 57 CM TAE 6 FR ART - [id number] Carotid stents STENT CAR L 40 MM DIA10-8 MM DEL SHTH L 57 CM TAE 6 FR ART  [employer]WD [id number] Left 1 Implanted         Drains: * No LDAs found *    Findings:  Infection Present At Time Of Surgery (PATOS) (choose all levels that have infection present):  No infection present  Other Findings:     Detailed Description of Procedure:   The patient was identified and the procedure was confirmed. The left neck was prepped and draped in the usual sterile fashion.      The right femoral vein was accessed using cook needle under us guidance.  The wire and than 8 fr sheath was placed.  A skin incision was made above the clavicle in a vertical fashion.  It was carried down through the subcutaneous tissue.  The heads of the sternocleidomastoid were split and the common carotid artery was dissected free of surrounding tissues and controled proximally with a vessel loop double looped.       The patient was then heparinized, maintaining an activated clotting time greater than 300 seconds.      A u stitch with 5.0 prolene placed and than the common carotid artery was accessed with micro needle and than wire was placed under fluoroscopy in the left common carotid artery.     Imaging preformed noting a

## 2025-05-30 NOTE — PROGRESS NOTES
VASCULAR SURGERY PROGRESS NOTE    Pt is being seen in f/u today regarding carotid artery stenosis    SUBJECTIVE  Pt seen/examined in PACU.  He is resting comfortably without any complaints except for hunger, wishing he had a diet.    CURRENT MEDICATIONS    sodium chloride        naloxone 0.4 mg in 10 mL sodium chloride syringe, sodium chloride flush, sodium chloride, meperidine, HYDROmorphone, HYDROmorphone, ondansetron, prochlorperazine, diphenhydrAMINE, labetalol, ipratropium 0.5 mg-albuterol 2.5 mg, lidocaine    sodium chloride flush  5-40 mL IntraVENous 2 times per day    lidocaine            PHYSICAL EXAM   /67   Pulse 80   Temp 97 °F (36.1 °C) (Temporal)   Resp 20   Ht 1.727 m (5' 8\")   Wt 83.5 kg (184 lb)   SpO2 100%   BMI 27.98 kg/m²     Intake/Output Summary (Last 24 hours) at 5/30/2025 1501  Last data filed at 5/30/2025 0945  Gross per 24 hour   Intake 1612.44 ml   Output 100 ml   Net 1512.44 ml        GENERAL: Alert and comfortable, oriented.  NEURO: Pupils are round and reactive, ocular motion appears normal, facial sensation equal, symmetric smile, no tongue deviation, normal cough, and phonation, symmetric shoulder shrug  LUNGS:  No increased work of breathing on room air  CARDIOVASCULAR: Regular rate and normotensive  ABDOMEN: Soft without distention or tenderness  EXTREMITIES:   5 out of 5  strength bilaterally and flexion extension 5 and 5 strength bilateral upper extremities.  Bilateral lower extremities with 5 out of 5 strength dorsiflexion and plantarflexion, normal sensation and able to wiggle toes.    LABS    Lab Results   Component Value Date    WBC 9.7 05/30/2025    HGB 16.3 05/30/2025    HCT 46.5 05/30/2025     05/30/2025    PROTIME 12.3 05/30/2025    INR 1.1 05/30/2025    APTT 31.6 05/30/2025    K 3.9 05/30/2025    BUN 14 05/30/2025    CREATININE 0.6 (L) 05/30/2025       ASSESSMENT/PLAN  60 y.o. male with left carotid artery stenosis status post left TCAR 5/30 who is

## 2025-05-30 NOTE — H&P
Vascular Surgery History & Physical      HPI :    Kyle Cheng is a 60 y.o. male who presents for evaluation of left carotid artery stenosis.  Patient known to our service for carotid artery stenosis and lower extremity claudication.  Past vascular surgical history includes left internal iliac artery and left common femoral artery angioplasty.    Patient presents today with his significant other at bedside.  Denies any acute overall changes to his health.  Prior workup for his bilateral carotid artery stenosis showed that the left internal carotid artery was 80 to 89% stenotic.    ROS : Negative if blank [], Positive if [x]  General Vascular   [] Fevers [x] Claudication (Blocks)   [] Chills [] Rest Pain   [] Weight Loss [] Tissue Loss   [] Chest Pain [] Clotting Disorder   [] SOB at rest [] Leg Swelling   [] SOB with exertion [] DVT/PE      [] Nausea    [] Vomitting [] Stroke/TIA   [] Abdominal Pain [] Focal weakness   [] Melena [] Slurred Speech   [] Hematochezia [] Vision Changes   [] Hematuria    [] Dysuria [] Hx of Central Catheters   [] Wears Glasses/Contacts  [] Dialysis and If so date initiated       [] Difficulty swallowing        Past Medical History:   Diagnosis Date    Carotid stenosis     Cerebral artery occlusion with cerebral infarction (HCC)     Diabetes mellitus (HCC)     Emphysema     History of penile implant     approx 2023    Hyperlipidemia     Hypertension     PVD (peripheral vascular disease) with claudication 02/11/2019        Past Surgical History:   Procedure Laterality Date    COLONOSCOPY      DENTAL SURGERY      TONSILLECTOMY      VASECTOMY         Current Medications:    sodium chloride 50 mL/hr at 05/30/25 0634    sodium chloride        sodium chloride flush, sodium chloride, lidocaine    sodium chloride flush  5-40 mL IntraVENous 2 times per day    ceFAZolin (ANCEF) IV  2,000 mg IntraVENous See Admin Instructions    lidocaine            Allergies:  Patient has no known

## 2025-05-31 VITALS
WEIGHT: 184 LBS | HEART RATE: 74 BPM | RESPIRATION RATE: 21 BRPM | DIASTOLIC BLOOD PRESSURE: 69 MMHG | BODY MASS INDEX: 27.89 KG/M2 | OXYGEN SATURATION: 93 % | SYSTOLIC BLOOD PRESSURE: 130 MMHG | HEIGHT: 68 IN | TEMPERATURE: 98.2 F

## 2025-05-31 LAB
ANION GAP SERPL CALCULATED.3IONS-SCNC: 10 MMOL/L (ref 7–16)
BUN SERPL-MCNC: 13 MG/DL (ref 6–20)
CALCIUM SERPL-MCNC: 9.1 MG/DL (ref 8.6–10)
CHLORIDE SERPL-SCNC: 106 MMOL/L (ref 98–107)
CO2 SERPL-SCNC: 24 MMOL/L (ref 22–29)
CREAT SERPL-MCNC: 0.6 MG/DL (ref 0.7–1.2)
ERYTHROCYTE [DISTWIDTH] IN BLOOD BY AUTOMATED COUNT: 12.4 % (ref 11.5–15)
GFR, ESTIMATED: >90 ML/MIN/1.73M2
GLUCOSE BLD-MCNC: 201 MG/DL (ref 74–99)
GLUCOSE BLD-MCNC: 298 MG/DL (ref 74–99)
GLUCOSE SERPL-MCNC: 252 MG/DL (ref 74–99)
HCT VFR BLD AUTO: 45.3 % (ref 37–54)
HGB BLD-MCNC: 15.6 G/DL (ref 12.5–16.5)
MCH RBC QN AUTO: 35.6 PG (ref 26–35)
MCHC RBC AUTO-ENTMCNC: 34.4 G/DL (ref 32–34.5)
MCV RBC AUTO: 103.4 FL (ref 80–99.9)
PLATELET # BLD AUTO: 135 K/UL (ref 130–450)
PMV BLD AUTO: 10 FL (ref 7–12)
POTASSIUM SERPL-SCNC: 4.8 MMOL/L (ref 3.5–5.1)
RBC # BLD AUTO: 4.38 M/UL (ref 3.8–5.8)
SODIUM SERPL-SCNC: 140 MMOL/L (ref 136–145)
WBC OTHER # BLD: 8 K/UL (ref 4.5–11.5)

## 2025-05-31 PROCEDURE — 6360000002 HC RX W HCPCS: Performed by: NURSE PRACTITIONER

## 2025-05-31 PROCEDURE — 85027 COMPLETE CBC AUTOMATED: CPT

## 2025-05-31 PROCEDURE — 2500000003 HC RX 250 WO HCPCS: Performed by: NURSE PRACTITIONER

## 2025-05-31 PROCEDURE — 82962 GLUCOSE BLOOD TEST: CPT

## 2025-05-31 PROCEDURE — 80048 BASIC METABOLIC PNL TOTAL CA: CPT

## 2025-05-31 PROCEDURE — 6370000000 HC RX 637 (ALT 250 FOR IP): Performed by: NURSE PRACTITIONER

## 2025-05-31 RX ADMIN — METFORMIN HYDROCHLORIDE 1000 MG: 1000 TABLET ORAL at 08:06

## 2025-05-31 RX ADMIN — INSULIN LISPRO 2 UNITS: 100 INJECTION, SOLUTION INTRAVENOUS; SUBCUTANEOUS at 09:15

## 2025-05-31 RX ADMIN — SODIUM CHLORIDE, PRESERVATIVE FREE 10 ML: 5 INJECTION INTRAVENOUS at 08:08

## 2025-05-31 RX ADMIN — LISINOPRIL 2.5 MG: 2.5 TABLET ORAL at 08:07

## 2025-05-31 RX ADMIN — ASPIRIN 81 MG CHEWABLE TABLET 81 MG: 81 TABLET CHEWABLE at 08:04

## 2025-05-31 RX ADMIN — INSULIN LISPRO 1 UNITS: 100 INJECTION, SOLUTION INTRAVENOUS; SUBCUTANEOUS at 05:46

## 2025-05-31 RX ADMIN — VENLAFAXINE HYDROCHLORIDE 75 MG: 75 CAPSULE, EXTENDED RELEASE ORAL at 08:06

## 2025-05-31 RX ADMIN — CLOPIDOGREL BISULFATE 75 MG: 75 TABLET, FILM COATED ORAL at 08:05

## 2025-05-31 RX ADMIN — METOPROLOL SUCCINATE 100 MG: 50 TABLET, EXTENDED RELEASE ORAL at 08:04

## 2025-05-31 RX ADMIN — GABAPENTIN 300 MG: 300 CAPSULE ORAL at 08:04

## 2025-05-31 RX ADMIN — WATER 2000 MG: 1 INJECTION INTRAMUSCULAR; INTRAVENOUS; SUBCUTANEOUS at 01:05

## 2025-05-31 NOTE — PLAN OF CARE
Problem: Chronic Conditions and Co-morbidities  Goal: Patient's chronic conditions and co-morbidity symptoms are monitored and maintained or improved  5/31/2025 0749 by Vin Roper RN  Outcome: Progressing  Flowsheets (Taken 5/31/2025 0749)  Care Plan - Patient's Chronic Conditions and Co-Morbidity Symptoms are Monitored and Maintained or Improved:   Monitor and assess patient's chronic conditions and comorbid symptoms for stability, deterioration, or improvement   Collaborate with multidisciplinary team to address chronic and comorbid conditions and prevent exacerbation or deterioration  5/30/2025 2122 by Emelina Nogueira RN  Outcome: Progressing  Flowsheets (Taken 5/30/2025 1630)  Care Plan - Patient's Chronic Conditions and Co-Morbidity Symptoms are Monitored and Maintained or Improved:   Monitor and assess patient's chronic conditions and comorbid symptoms for stability, deterioration, or improvement   Collaborate with multidisciplinary team to address chronic and comorbid conditions and prevent exacerbation or deterioration   Update acute care plan with appropriate goals if chronic or comorbid symptoms are exacerbated and prevent overall improvement and discharge     Problem: Discharge Planning  Goal: Discharge to home or other facility with appropriate resources  5/31/2025 0749 by Vin Roper RN  Outcome: Progressing  Flowsheets (Taken 5/31/2025 0749)  Discharge to home or other facility with appropriate resources:   Identify barriers to discharge with patient and caregiver   Identify discharge learning needs (meds, wound care, etc)  5/30/2025 2122 by Emelina Nogueira, RN  Outcome: Progressing  Flowsheets (Taken 5/30/2025 1630)  Discharge to home or other facility with appropriate resources:   Identify barriers to discharge with patient and caregiver   Arrange for needed discharge resources and transportation as appropriate   Identify discharge learning needs (meds, wound care, etc)   Arrange for

## 2025-05-31 NOTE — PROGRESS NOTES
4 Eyes Skin Assessment     NAME:  Kyle Cheng  YOB: 1965  MEDICAL RECORD NUMBER:  [id number]    The patient is being assessed for  Admission    I agree that at least one RN has performed a thorough Head to Toe Skin Assessment on the patient. ALL assessment sites listed below have been assessed.      Areas assessed by both nurses:    Head, Face, Ears, Shoulders, Back, Chest, Arms, Elbows, Hands, Sacrum. Buttock, Coccyx, Ischium, Legs. Feet and Heels, and Under Medical Devices         Does the Patient have a Wound? No noted wound(s)       Ab Prevention initiated by RN: Yes  Wound Care Orders initiated by RN: No    Pressure Injury (Stage 3,4, Unstageable, DTI, NWPT, and Complex wounds) if present, place Wound referral order by RN under : No    New Ostomies, if present place, Ostomy referral order under : No     Nurse 1 eSignature: Electronically signed by Emelina Nogueira RN on 5/30/25 at 4:30 PM EDT    **SHARE this note so that the co-signing nurse can place an eSignature**    Nurse 2 eSignature: {Esignature:[id number]}

## 2025-05-31 NOTE — PROGRESS NOTES
Pt discharged. Stent card sent with patient. Ivs removed. Belongings including phone, phone , wallet, keys, glasses, and clothes/shoes - all sent with patient

## 2025-05-31 NOTE — PROGRESS NOTES
Patient arrived to [facility] from PACU. RN's at bedside. Placed on bedside monitor. Vital signs stable.

## 2025-05-31 NOTE — PLAN OF CARE
Problem: Chronic Conditions and Co-morbidities  Goal: Patient's chronic conditions and co-morbidity symptoms are monitored and maintained or improved  5/31/2025 1326 by Vin Roper RN  Outcome: Completed  5/31/2025 0749 by Vin Roper RN  Outcome: Progressing  Flowsheets (Taken 5/31/2025 0749)  Care Plan - Patient's Chronic Conditions and Co-Morbidity Symptoms are Monitored and Maintained or Improved:   Monitor and assess patient's chronic conditions and comorbid symptoms for stability, deterioration, or improvement   Collaborate with multidisciplinary team to address chronic and comorbid conditions and prevent exacerbation or deterioration     Problem: Discharge Planning  Goal: Discharge to home or other facility with appropriate resources  5/31/2025 1326 by Vin Roper RN  Outcome: Completed  5/31/2025 0749 by Vin Roper RN  Outcome: Progressing  Flowsheets (Taken 5/31/2025 0749)  Discharge to home or other facility with appropriate resources:   Identify barriers to discharge with patient and caregiver   Identify discharge learning needs (meds, wound care, etc)     Problem: Safety - Adult  Goal: Free from fall injury  5/31/2025 1326 by Vin Roper RN  Outcome: Completed  5/31/2025 0749 by Vin Roper RN  Outcome: Progressing  Flowsheets (Taken 5/31/2025 0749)  Free From Fall Injury:   Instruct family/caregiver on patient safety   Based on caregiver fall risk screen, instruct family/caregiver to ask for assistance with transferring infant if caregiver noted to have fall risk factors     Problem: Pain  Goal: Verbalizes/displays adequate comfort level or baseline comfort level  5/31/2025 1326 by Vin Roper RN  Outcome: Completed  5/31/2025 0749 by Vin Roper RN  Outcome: Progressing  Flowsheets (Taken 5/31/2025 0749)  Verbalizes/displays adequate comfort level or baseline comfort level:   Encourage patient to monitor pain and request assistance   Administer analgesics

## 2025-05-31 NOTE — PROGRESS NOTES
VASCULAR SURGERY PROGRESS NOTE    Pt is being seen in f/u today regarding carotid artery stenosis s/p L TCAR 5/30    SUBJECTIVE  Resting comfortably. No issues. Excited to go home    CURRENT MEDICATIONS    sodium chloride 75 mL/hr at 05/30/25 2001    sodium chloride      dextrose        sodium chloride flush, sodium chloride, HYDROmorphone **OR** HYDROmorphone, ondansetron **OR** ondansetron, oxyCODONE, glucose, dextrose bolus **OR** dextrose bolus, glucagon (rDNA), dextrose, albuterol    venlafaxine  75 mg Oral Daily with breakfast    metoprolol succinate  100 mg Oral Daily    lisinopril  2.5 mg Oral Daily    metFORMIN  1,000 mg Oral BID WC    gabapentin  300 mg Oral QAM    clopidogrel  75 mg Oral Daily    atorvastatin  10 mg Oral Nightly    aspirin  81 mg Oral Daily    sodium chloride flush  5-40 mL IntraVENous 2 times per day    insulin lispro  0-4 Units SubCUTAneous 4x Daily AC & HS        PHYSICAL EXAM   BP (!) 142/76   Pulse 80   Temp 98.1 °F (36.7 °C) (Temporal)   Resp 15   Ht 1.727 m (5' 8\")   Wt 83.5 kg (184 lb)   SpO2 99%   BMI 27.98 kg/m²     Intake/Output Summary (Last 24 hours) at 5/31/2025 0543  Last data filed at 5/31/2025 0400  Gross per 24 hour   Intake 1617.69 ml   Output 1425 ml   Net 192.69 ml        GENERAL: Alert and comfortable, oriented.  NEURO: Pupils are round and reactive, ocular motion appears normal, facial sensation equal, symmetric smile, no tongue deviation. Normal phonation. Symmetric shoulder shrug  NECK: left sided incision C/D/I covered with dermabond. Some ecchymosis however no signs of hematoma or airway compromise  LUNGS:  No increased work of breathing on 2L  CARDIOVASCULAR: Regular rate and normotensive  ABDOMEN: Soft without distention or tenderness  EXTREMITIES:   Sensation and motor 5/5 in bilateral upper and lower extremities. Strong signals in bilateral DP/PT pulse. R fem access site with some ecchymosis however no hematoma.     LABS    Lab Results   Component  Value Date    WBC 9.7 05/30/2025    HGB 16.3 05/30/2025    HCT 46.5 05/30/2025     05/30/2025    PROTIME 12.3 05/30/2025    INR 1.1 05/30/2025    APTT 31.6 05/30/2025    K 3.9 05/30/2025    BUN 14 05/30/2025    CREATININE 0.6 (L) 05/30/2025       ASSESSMENT/PLAN  60 y.o. male with left carotid artery stenosis s/p L TCAR 5/30      - advancement of diet as pt is progressing well   - arterial line removed due to poor wave form, pt has had appropriate SBP and MAP on cuff overnight   - HTN as needed with goal SBP >110,<150 with MAP goal >80   - manage pain as needed   - monitor output, UOP appropriate overnight at 0.7 ml/kg/hr   - wean O2 as able, only required overnight while sleeping   - monitor neurovascular exam   - continue ASA/plavix   - continue home meds   - pt okay to be up to chair   - anticipate discharge after attending evaluation   - morning labs, awaiting BMP   - discussed with Dr. REUBEN Mejias, DO  Surgery Resident PGY-2  5/31/2025  5:43 AM

## 2025-06-02 LAB
ACTIVATED CLOTTING TIME, LOW RANGE: 163 SEC
ACTIVATED CLOTTING TIME, LOW RANGE: 176 SEC
ACTIVATED CLOTTING TIME, LOW RANGE: >400 SEC

## 2025-06-10 LAB
VAS LEFT ABI: 0.82
VAS LEFT ARM BP: 99 MMHG
VAS LEFT DORSALIS PEDIS BP: 94 MMHG
VAS LEFT PTA BP: 107 MMHG
VAS LEFT TBI: 0.73
VAS LEFT TOE PRESSURE: 95 MMHG
VAS RIGHT ABI: 0.67
VAS RIGHT ARM BP: 130 MMHG
VAS RIGHT DORSALIS PEDIS BP: 82 MMHG
VAS RIGHT PTA BP: 87 MMHG
VAS RIGHT TBI: 0.6
VAS RIGHT TOE PRESSURE: 78 MMHG

## 2025-06-16 ENCOUNTER — OFFICE VISIT (OUTPATIENT)
Dept: VASCULAR SURGERY | Age: 60
End: 2025-06-16

## 2025-06-16 DIAGNOSIS — I65.23 ASYMPTOMATIC BILATERAL CAROTID ARTERY STENOSIS: Primary | ICD-10-CM

## 2025-06-16 PROCEDURE — 99024 POSTOP FOLLOW-UP VISIT: CPT | Performed by: SURGERY

## 2025-06-16 NOTE — PROGRESS NOTES
6/16/2025    Kyle ARREGUIN Prosper  1965    Previous Vascular Procedures  5/30/25  TCAR       Patient returns for post operative evaluation.  The patient denies any unexpected problems since hospital discharge.  Patient denies hx of stroke, TIA, focal weakness, slurred speech or amaurosis fugax.  He has been doing well and is ready to return to work.      Physical Exam  Gen Awake and Alert  CN II - XII intact, no noted deficits    left  neck incision is healing without evidence of infection  Right groin incision  is healing without evidence of infection  CVS S1S2   Lungs   Ctab/l  Radial pulse are appreciated bilaterally.    Assessment/Plan  S/p left  TCAR  - I reviewed with the patient that normal activities can be resumed as tolerated  - He is cleared to return full duty to work  - Continue ASA, plavix, and statin    - He understands to call/go to ER if develops signs of stroke, TIA, focal weakness, slurred speech or amaurosis fugax  - Return in 6 months for follow-up carotid ultrasound.  - Call sooner with any issues    Pt seen and plan reviewed with Dr. James Mcdonald, CASSIDY - CNP

## (undated) DEVICE — 18 GA N.G. KIT, 10 PACK: Brand: SITE-RITE

## (undated) DEVICE — TRAY,SKIN SCRUB,DRY,W/GAUZE: Brand: MEDLINE

## (undated) DEVICE — LOOP VES W25MM THK1MM MAXI RED SIL FLD REPELLENT 100 PER

## (undated) DEVICE — ELECTRODE PT RET AD L9FT HI MOIST COND ADH HYDRGEL CORDED

## (undated) DEVICE — COVER,TABLE,60X90,STERILE: Brand: MEDLINE

## (undated) DEVICE — 1LYRTR 16FR10ML100%SILTMPS SNP: Brand: MEDLINE INDUSTRIES, INC.

## (undated) DEVICE — COVER,LIGHT HANDLE,FLX,2/PK: Brand: MEDLINE INDUSTRIES, INC.

## (undated) DEVICE — 18GA (1.3MM OD;1.0MM ID) X 7CM INTRODUCER NEEDLE

## (undated) DEVICE — LARGE BORE STOPCOCK WITH ROTATING MALE LUER LOCK

## (undated) DEVICE — ANGLED-TIP ARTERIAL SHEATH CONFIGURATION
Type: IMPLANTABLE DEVICE | Site: GROIN | Status: NON-FUNCTIONAL
Brand: ENROUTE TRANSCAROTID NEUROPROTECTION SYSTEM
Removed: 2025-05-30

## (undated) DEVICE — GUIDEWIRE VASC L180CM DIA0.035IN L15CM STR TIP PTFE S STL

## (undated) DEVICE — SYRINGE MED 5ML STD CLR PLAS LUERLOCK TIP N CTRL DISP

## (undated) DEVICE — INFLATION DEVICE KIT: Brand: ENCORE™ 26 ADVANTAGE KIT

## (undated) DEVICE — MINOR VASCULAR: Brand: MEDLINE INDUSTRIES, INC.

## (undated) DEVICE — SOLUTION IV 500ML 0.9% SOD CHL PH 5 INJ USP VIAFLX PLAS

## (undated) DEVICE — DRAPE EQUIP BANDED BG 36X28 IN W/ROUNDED CORNER SNAPKOVER

## (undated) DEVICE — 3M™ IOBAN™ 2 ANTIMICROBIAL INCISE DRAPE 6640EZ: Brand: IOBAN™ 2

## (undated) DEVICE — GARMENT,MEDLINE,DVT,INT,CALF,MED, GEN2: Brand: MEDLINE

## (undated) DEVICE — TOWEL,OR,DSP,ST,BLUE,STD,6/PK,12PK/CS: Brand: MEDLINE

## (undated) DEVICE — BENTSON WIRE GUIDE 20CM DISTAL FLEXIBILITY WITH SOFTENED TIP: Brand: BENTSON

## (undated) DEVICE — AGENT HEMSTAT W2XL4IN OXIDIZED REGENERATED CELOS ABSRB

## (undated) DEVICE — 3M™ TEGADERM™ TRANSPARENT FILM DRESSING FRAME STYLE, 1626W, 4 IN X 4-3/4 IN (10 CM X 12 CM), 50/CT 4CT/CASE: Brand: 3M™ TEGADERM™

## (undated) DEVICE — GUIDEWIRE VASCULAR L95CM DIA0.014IN ENROUTE

## (undated) DEVICE — SYRINGE MED 10ML LUERLOCK TIP W/O SFTY DISP

## (undated) DEVICE — INFLATION DEVICE: Brand: ENCORE™ 26

## (undated) DEVICE — PERCUTANEOUS ENTRY THINWALL NEEDLE  ONE-PART: Brand: COOK

## (undated) DEVICE — ANG DR W/PANELS: Brand: CONVERTORS

## (undated) DEVICE — Device

## (undated) DEVICE — SYRINGE MED 20ML STD CLR PLAS LUERLOCK TIP N CTRL DISP

## (undated) DEVICE — GAUZE,SPONGE,4"X4",8PLY,STRL,LF,10/TRAY: Brand: MEDLINE

## (undated) DEVICE — MEDIA CONTRAST ISOVUE 300 100ML

## (undated) DEVICE — INTRODUCER SHTH 0.018 IN 21 GAX7 CM TRANSCAROTID ACCS KT